# Patient Record
Sex: FEMALE | Race: WHITE | NOT HISPANIC OR LATINO | Employment: UNEMPLOYED | URBAN - NONMETROPOLITAN AREA
[De-identification: names, ages, dates, MRNs, and addresses within clinical notes are randomized per-mention and may not be internally consistent; named-entity substitution may affect disease eponyms.]

---

## 2017-01-01 ENCOUNTER — HOSPITAL ENCOUNTER (INPATIENT)
Facility: HOSPITAL | Age: 77
LOS: 7 days | Discharge: NURSING, REHAB OR LONG-TERM CARE | End: 2017-02-17
Attending: PSYCHIATRY & NEUROLOGY | Admitting: PSYCHIATRY & NEUROLOGY

## 2017-01-01 ENCOUNTER — OUTSIDE FACILITY SERVICE (OUTPATIENT)
Dept: FAMILY MEDICINE CLINIC | Facility: CLINIC | Age: 77
End: 2017-01-01

## 2017-01-01 ENCOUNTER — HOSPITAL ENCOUNTER (EMERGENCY)
Facility: HOSPITAL | Age: 77
Discharge: HOME | End: 2017-02-10
Attending: EMERGENCY MEDICINE | Admitting: EMERGENCY MEDICINE

## 2017-01-01 ENCOUNTER — HOSPITAL ENCOUNTER (EMERGENCY)
Facility: HOSPITAL | Age: 77
End: 2017-06-27
Attending: FAMILY MEDICINE | Admitting: FAMILY MEDICINE

## 2017-01-01 VITALS
HEART RATE: 91 BPM | OXYGEN SATURATION: 95 % | HEIGHT: 65 IN | SYSTOLIC BLOOD PRESSURE: 137 MMHG | TEMPERATURE: 97.6 F | RESPIRATION RATE: 18 BRPM | WEIGHT: 160 LBS | DIASTOLIC BLOOD PRESSURE: 63 MMHG | BODY MASS INDEX: 26.66 KG/M2

## 2017-01-01 VITALS
SYSTOLIC BLOOD PRESSURE: 127 MMHG | OXYGEN SATURATION: 96 % | TEMPERATURE: 97.6 F | DIASTOLIC BLOOD PRESSURE: 9 MMHG | WEIGHT: 160 LBS | BODY MASS INDEX: 26.66 KG/M2 | RESPIRATION RATE: 18 BRPM | HEIGHT: 65 IN | HEART RATE: 81 BPM

## 2017-01-01 DIAGNOSIS — F29 PSYCHOSIS, UNSPECIFIED PSYCHOSIS TYPE (HCC): Primary | ICD-10-CM

## 2017-01-01 DIAGNOSIS — N39.0 ACUTE UTI: ICD-10-CM

## 2017-01-01 DIAGNOSIS — I46.9 CARDIAC ARREST (HCC): Primary | ICD-10-CM

## 2017-01-01 DIAGNOSIS — F20.0 SCHIZOPHRENIA, PARANOID TYPE (HCC): Primary | ICD-10-CM

## 2017-01-01 LAB
ALBUMIN SERPL-MCNC: 3.8 G/DL (ref 3.4–4.8)
ALBUMIN/GLOB SERPL: 1.5 G/DL (ref 1.1–1.8)
ALP SERPL-CCNC: 55 U/L (ref 38–126)
ALT SERPL W P-5'-P-CCNC: 22 U/L (ref 9–52)
AMPHET+METHAMPHET UR QL: NEGATIVE
ANION GAP SERPL CALCULATED.3IONS-SCNC: 7 MMOL/L (ref 5–15)
APAP SERPL-MCNC: <10 MCG/ML (ref 10–30)
AST SERPL-CCNC: 24 U/L (ref 14–36)
BACTERIA SPEC AEROBE CULT: NORMAL
BACTERIA UR QL AUTO: ABNORMAL /HPF
BARBITURATES UR QL SCN: NEGATIVE
BASOPHILS # BLD AUTO: 0.06 10*3/MM3 (ref 0–0.2)
BASOPHILS NFR BLD AUTO: 0.7 % (ref 0–2)
BENZODIAZ UR QL SCN: NEGATIVE
BILIRUB SERPL-MCNC: 0.4 MG/DL (ref 0.2–1.3)
BILIRUB UR QL STRIP: NEGATIVE
BUN BLD-MCNC: 9 MG/DL (ref 7–21)
BUN/CREAT SERPL: 19.6 (ref 7–25)
CALCIUM SPEC-SCNC: 9 MG/DL (ref 8.4–10.2)
CANNABINOIDS SERPL QL: NEGATIVE
CHLORIDE SERPL-SCNC: 96 MMOL/L (ref 95–110)
CLARITY UR: CLEAR
CO2 SERPL-SCNC: 29 MMOL/L (ref 22–31)
COCAINE UR QL: NEGATIVE
COLOR UR: YELLOW
CREAT BLD-MCNC: 0.46 MG/DL (ref 0.5–1)
DEPRECATED RDW RBC AUTO: 43.5 FL (ref 36.4–46.3)
EOSINOPHIL # BLD AUTO: 0.13 10*3/MM3 (ref 0–0.7)
EOSINOPHIL NFR BLD AUTO: 1.5 % (ref 0–7)
ERYTHROCYTE [DISTWIDTH] IN BLOOD BY AUTOMATED COUNT: 13.4 % (ref 11.5–14.5)
ETHANOL BLD-MCNC: <10 MG/DL (ref 0–10)
ETHANOL UR QL: <0.01 %
GFR SERPL CREATININE-BSD FRML MDRD: 131 ML/MIN/1.73 (ref 39–90)
GLOBULIN UR ELPH-MCNC: 2.6 GM/DL (ref 2.3–3.5)
GLUCOSE BLD-MCNC: 115 MG/DL (ref 60–100)
GLUCOSE BLDC GLUCOMTR-MCNC: 255 MG/DL (ref 70–130)
GLUCOSE BLDC GLUCOMTR-MCNC: 97 MG/DL (ref 70–130)
GLUCOSE UR STRIP-MCNC: NEGATIVE MG/DL
HCT VFR BLD AUTO: 38 % (ref 35–45)
HGB BLD-MCNC: 12.3 G/DL (ref 12–15.5)
HGB UR QL STRIP.AUTO: NEGATIVE
HOLD SPECIMEN: NORMAL
HOLD SPECIMEN: NORMAL
HYALINE CASTS UR QL AUTO: ABNORMAL /LPF
IMM GRANULOCYTES # BLD: 0.02 10*3/MM3 (ref 0–0.02)
IMM GRANULOCYTES NFR BLD: 0.2 % (ref 0–0.5)
KETONES UR QL STRIP: NEGATIVE
LEUKOCYTE ESTERASE UR QL STRIP.AUTO: ABNORMAL
LYMPHOCYTES # BLD AUTO: 2.41 10*3/MM3 (ref 0.6–4.2)
LYMPHOCYTES NFR BLD AUTO: 27.5 % (ref 10–50)
MCH RBC QN AUTO: 28.8 PG (ref 26.5–34)
MCHC RBC AUTO-ENTMCNC: 32.4 G/DL (ref 31.4–36)
MCV RBC AUTO: 89 FL (ref 80–98)
METHADONE UR QL SCN: NEGATIVE
MONOCYTES # BLD AUTO: 0.91 10*3/MM3 (ref 0–0.9)
MONOCYTES NFR BLD AUTO: 10.4 % (ref 0–12)
NEUTROPHILS # BLD AUTO: 5.23 10*3/MM3 (ref 2–8.6)
NEUTROPHILS NFR BLD AUTO: 59.7 % (ref 37–80)
NITRITE UR QL STRIP: NEGATIVE
OPIATES UR QL: NEGATIVE
OXYCODONE UR QL SCN: NEGATIVE
PH UR STRIP.AUTO: 6 [PH] (ref 5–9)
PLATELET # BLD AUTO: 231 10*3/MM3 (ref 150–450)
PMV BLD AUTO: 8.7 FL (ref 8–12)
POTASSIUM BLD-SCNC: 4.1 MMOL/L (ref 3.5–5.1)
PROT SERPL-MCNC: 6.4 G/DL (ref 6.3–8.6)
PROT UR QL STRIP: NEGATIVE
RBC # BLD AUTO: 4.27 10*6/MM3 (ref 3.77–5.16)
RBC # UR: ABNORMAL /HPF
REF LAB TEST METHOD: ABNORMAL
SALICYLATES SERPL-MCNC: <1 MG/DL (ref 10–20)
SODIUM BLD-SCNC: 132 MMOL/L (ref 137–145)
SP GR UR STRIP: 1.01 (ref 1–1.03)
SQUAMOUS #/AREA URNS HPF: ABNORMAL /HPF
T4 SERPL-MCNC: 9.21 MCG/DL (ref 5.5–11)
TSH SERPL DL<=0.05 MIU/L-ACNC: 10.3 MIU/ML (ref 0.46–4.68)
UROBILINOGEN UR QL STRIP: ABNORMAL
VALPROATE SERPL-MCNC: 60.5 MCG/ML (ref 50–120)
WBC NRBC COR # BLD: 8.76 10*3/MM3 (ref 3.2–9.8)
WBC UR QL AUTO: ABNORMAL /HPF
WHOLE BLOOD HOLD SPECIMEN: NORMAL
WHOLE BLOOD HOLD SPECIMEN: NORMAL

## 2017-01-01 PROCEDURE — 99232 SBSQ HOSP IP/OBS MODERATE 35: CPT | Performed by: PSYCHIATRY & NEUROLOGY

## 2017-01-01 PROCEDURE — 25010000002 RISPERIDONE MICROSPHERES PER 0.5 MG: Performed by: PSYCHIATRY & NEUROLOGY

## 2017-01-01 PROCEDURE — 99232 SBSQ HOSP IP/OBS MODERATE 35: CPT | Performed by: NURSE PRACTITIONER

## 2017-01-01 PROCEDURE — 99221 1ST HOSP IP/OBS SF/LOW 40: CPT | Performed by: FAMILY MEDICINE

## 2017-01-01 PROCEDURE — 99307 SBSQ NF CARE SF MDM 10: CPT | Performed by: FAMILY MEDICINE

## 2017-01-01 PROCEDURE — 99284 EMERGENCY DEPT VISIT MOD MDM: CPT

## 2017-01-01 PROCEDURE — 97161 PT EVAL LOW COMPLEX 20 MIN: CPT

## 2017-01-01 PROCEDURE — 99305 1ST NF CARE MODERATE MDM 35: CPT | Performed by: FAMILY MEDICINE

## 2017-01-01 PROCEDURE — 80164 ASSAY DIPROPYLACETIC ACD TOT: CPT | Performed by: NURSE PRACTITIONER

## 2017-01-01 PROCEDURE — 25010000002 EPINEPHRINE 0.1 MG/ML SOLUTION PREFILLED SYRINGE: Performed by: FAMILY MEDICINE

## 2017-01-01 PROCEDURE — 90791 PSYCH DIAGNOSTIC EVALUATION: CPT | Performed by: PSYCHIATRY & NEUROLOGY

## 2017-01-01 PROCEDURE — 99238 HOSP IP/OBS DSCHRG MGMT 30/<: CPT | Performed by: NURSE PRACTITIONER

## 2017-01-01 PROCEDURE — 99308 SBSQ NF CARE LOW MDM 20: CPT | Performed by: FAMILY MEDICINE

## 2017-01-01 PROCEDURE — G8979 MOBILITY GOAL STATUS: HCPCS

## 2017-01-01 PROCEDURE — G8980 MOBILITY D/C STATUS: HCPCS

## 2017-01-01 PROCEDURE — 94799 UNLISTED PULMONARY SVC/PX: CPT

## 2017-01-01 PROCEDURE — 82962 GLUCOSE BLOOD TEST: CPT

## 2017-01-01 PROCEDURE — 92950 HEART/LUNG RESUSCITATION CPR: CPT | Performed by: NURSE PRACTITIONER

## 2017-01-01 PROCEDURE — G8978 MOBILITY CURRENT STATUS: HCPCS

## 2017-01-01 RX ORDER — LEVOTHYROXINE SODIUM 0.1 MG/1
100 TABLET ORAL DAILY
Qty: 30 TABLET | Refills: 0 | Status: SHIPPED | OUTPATIENT
Start: 2017-01-01

## 2017-01-01 RX ORDER — ASPIRIN 81 MG/1
81 TABLET, CHEWABLE ORAL DAILY
COMMUNITY

## 2017-01-01 RX ORDER — TEMAZEPAM 15 MG/1
30 CAPSULE ORAL NIGHTLY PRN
COMMUNITY
End: 2017-01-01 | Stop reason: HOSPADM

## 2017-01-01 RX ORDER — LOPERAMIDE HYDROCHLORIDE 2 MG/1
2 CAPSULE ORAL 4 TIMES DAILY PRN
Status: DISCONTINUED | OUTPATIENT
Start: 2017-01-01 | End: 2017-01-01 | Stop reason: HOSPADM

## 2017-01-01 RX ORDER — RISPERIDONE 1 MG/1
1 TABLET ORAL DAILY
COMMUNITY
End: 2017-01-01 | Stop reason: HOSPADM

## 2017-01-01 RX ORDER — METOPROLOL TARTRATE 50 MG/1
50 TABLET, FILM COATED ORAL 2 TIMES DAILY
COMMUNITY

## 2017-01-01 RX ORDER — SODIUM CHLORIDE 0.9 % (FLUSH) 0.9 %
10 SYRINGE (ML) INJECTION AS NEEDED
Status: DISCONTINUED | OUTPATIENT
Start: 2017-01-01 | End: 2017-01-01 | Stop reason: HOSPADM

## 2017-01-01 RX ORDER — IPRATROPIUM BROMIDE AND ALBUTEROL SULFATE 2.5; .5 MG/3ML; MG/3ML
3 SOLUTION RESPIRATORY (INHALATION) EVERY 4 HOURS PRN
COMMUNITY

## 2017-01-01 RX ORDER — DIVALPROEX SODIUM 125 MG/1
1000 CAPSULE, COATED PELLETS ORAL NIGHTLY
Qty: 240 CAPSULE | Refills: 0 | Status: SHIPPED | OUTPATIENT
Start: 2017-01-01

## 2017-01-01 RX ORDER — ASPIRIN 81 MG/1
81 TABLET, CHEWABLE ORAL DAILY
Qty: 30 TABLET | Refills: 0 | Status: SHIPPED | OUTPATIENT
Start: 2017-01-01

## 2017-01-01 RX ORDER — DIVALPROEX SODIUM 125 MG/1
500 CAPSULE, COATED PELLETS ORAL 2 TIMES DAILY
Status: DISCONTINUED | OUTPATIENT
Start: 2017-01-01 | End: 2017-01-01

## 2017-01-01 RX ORDER — LEVOTHYROXINE SODIUM 0.1 MG/1
100 TABLET ORAL DAILY
COMMUNITY

## 2017-01-01 RX ORDER — CLONIDINE HYDROCHLORIDE 0.1 MG/1
0.1 TABLET ORAL EVERY 4 HOURS PRN
Status: DISCONTINUED | OUTPATIENT
Start: 2017-01-01 | End: 2017-01-01 | Stop reason: HOSPADM

## 2017-01-01 RX ORDER — ASPIRIN 81 MG/1
81 TABLET, CHEWABLE ORAL DAILY
Status: DISCONTINUED | OUTPATIENT
Start: 2017-01-01 | End: 2017-01-01 | Stop reason: HOSPADM

## 2017-01-01 RX ORDER — HYDROXYZINE PAMOATE 50 MG/1
50 CAPSULE ORAL EVERY 6 HOURS PRN
Status: DISCONTINUED | OUTPATIENT
Start: 2017-01-01 | End: 2017-01-01 | Stop reason: HOSPADM

## 2017-01-01 RX ORDER — NITROFURANTOIN 25; 75 MG/1; MG/1
100 CAPSULE ORAL ONCE
Status: DISCONTINUED | OUTPATIENT
Start: 2017-01-01 | End: 2017-01-01 | Stop reason: HOSPADM

## 2017-01-01 RX ORDER — LEVOTHYROXINE SODIUM 0.1 MG/1
100 TABLET ORAL DAILY
Status: DISCONTINUED | OUTPATIENT
Start: 2017-01-01 | End: 2017-01-01 | Stop reason: HOSPADM

## 2017-01-01 RX ORDER — RISPERIDONE 1 MG/1
3 TABLET ORAL 2 TIMES DAILY
Status: DISCONTINUED | OUTPATIENT
Start: 2017-01-01 | End: 2017-01-01

## 2017-01-01 RX ORDER — VALPROIC ACID 250 MG/5ML
750 SOLUTION ORAL NIGHTLY
COMMUNITY
End: 2017-01-01 | Stop reason: HOSPADM

## 2017-01-01 RX ORDER — DIVALPROEX SODIUM 125 MG/1
500 CAPSULE, COATED PELLETS ORAL NIGHTLY
Status: DISCONTINUED | OUTPATIENT
Start: 2017-01-01 | End: 2017-01-01

## 2017-01-01 RX ORDER — VALPROIC ACID 250 MG/5ML
500 SOLUTION ORAL DAILY
COMMUNITY
End: 2017-01-01 | Stop reason: HOSPADM

## 2017-01-01 RX ORDER — PSEUDOEPHEDRINE HCL 30 MG
100 TABLET ORAL 2 TIMES DAILY PRN
Qty: 60 EACH | Refills: 0 | Status: SHIPPED | OUTPATIENT
Start: 2017-01-01

## 2017-01-01 RX ORDER — RISPERIDONE 1 MG/1
1 TABLET ORAL 2 TIMES DAILY
Qty: 21 TABLET | Refills: 0 | Status: SHIPPED | OUTPATIENT
Start: 2017-01-01

## 2017-01-01 RX ORDER — DIVALPROEX SODIUM 125 MG/1
1000 CAPSULE, COATED PELLETS ORAL NIGHTLY
Status: DISCONTINUED | OUTPATIENT
Start: 2017-01-01 | End: 2017-01-01

## 2017-01-01 RX ORDER — RISPERIDONE 1 MG/1
2 TABLET ORAL 2 TIMES DAILY
Status: DISCONTINUED | OUTPATIENT
Start: 2017-01-01 | End: 2017-01-01 | Stop reason: HOSPADM

## 2017-01-01 RX ORDER — RISPERIDONE 1 MG/1
2 TABLET ORAL 2 TIMES DAILY
Status: DISCONTINUED | OUTPATIENT
Start: 2017-01-01 | End: 2017-01-01

## 2017-01-01 RX ORDER — DIVALPROEX SODIUM 125 MG/1
1000 CAPSULE, COATED PELLETS ORAL NIGHTLY
Status: DISCONTINUED | OUTPATIENT
Start: 2017-01-01 | End: 2017-01-01 | Stop reason: HOSPADM

## 2017-01-01 RX ORDER — IPRATROPIUM BROMIDE AND ALBUTEROL SULFATE 2.5; .5 MG/3ML; MG/3ML
3 SOLUTION RESPIRATORY (INHALATION) EVERY 4 HOURS PRN
Qty: 360 ML | Refills: 0 | Status: SHIPPED | OUTPATIENT
Start: 2017-01-01

## 2017-01-01 RX ORDER — MAGNESIUM HYDROXIDE/ALUMINUM HYDROXICE/SIMETHICONE 120; 1200; 1200 MG/30ML; MG/30ML; MG/30ML
30 SUSPENSION ORAL EVERY 6 HOURS PRN
Status: DISCONTINUED | OUTPATIENT
Start: 2017-01-01 | End: 2017-01-01 | Stop reason: HOSPADM

## 2017-01-01 RX ORDER — DOCUSATE SODIUM 100 MG/1
100 CAPSULE, LIQUID FILLED ORAL 2 TIMES DAILY PRN
Status: DISCONTINUED | OUTPATIENT
Start: 2017-01-01 | End: 2017-01-01 | Stop reason: HOSPADM

## 2017-01-01 RX ORDER — TRAZODONE HYDROCHLORIDE 50 MG/1
50 TABLET ORAL NIGHTLY PRN
Qty: 30 TABLET | Refills: 0 | Status: SHIPPED | OUTPATIENT
Start: 2017-01-01

## 2017-01-01 RX ORDER — TRAZODONE HYDROCHLORIDE 50 MG/1
50 TABLET ORAL NIGHTLY PRN
Status: DISCONTINUED | OUTPATIENT
Start: 2017-01-01 | End: 2017-01-01 | Stop reason: HOSPADM

## 2017-01-01 RX ORDER — ACETAMINOPHEN 325 MG/1
650 TABLET ORAL EVERY 4 HOURS PRN
Status: DISCONTINUED | OUTPATIENT
Start: 2017-01-01 | End: 2017-01-01 | Stop reason: HOSPADM

## 2017-01-01 RX ORDER — IPRATROPIUM BROMIDE AND ALBUTEROL SULFATE 2.5; .5 MG/3ML; MG/3ML
3 SOLUTION RESPIRATORY (INHALATION) EVERY 4 HOURS PRN
Status: DISCONTINUED | OUTPATIENT
Start: 2017-01-01 | End: 2017-01-01 | Stop reason: HOSPADM

## 2017-01-01 RX ORDER — RISPERIDONE 3 MG/1
3 TABLET ORAL NIGHTLY
COMMUNITY
End: 2017-01-01 | Stop reason: HOSPADM

## 2017-01-01 RX ADMIN — RISPERIDONE 50 MG: KIT at 10:45

## 2017-01-01 RX ADMIN — RISPERIDONE 2 MG: 1 TABLET ORAL at 17:20

## 2017-01-01 RX ADMIN — RISPERIDONE 3 MG: 1 TABLET ORAL at 18:07

## 2017-01-01 RX ADMIN — EPINEPHRINE 1 MG: 0.1 INJECTION INTRACARDIAC; INTRAVENOUS at 11:16

## 2017-01-01 RX ADMIN — RISPERIDONE 3 MG: 1 TABLET ORAL at 08:16

## 2017-01-01 RX ADMIN — METOPROLOL TARTRATE 25 MG: 25 TABLET ORAL at 08:44

## 2017-01-01 RX ADMIN — SODIUM BICARBONATE 50 MEQ: 84 INJECTION INTRAVENOUS at 11:18

## 2017-01-01 RX ADMIN — TRAZODONE HYDROCHLORIDE 50 MG: 50 TABLET ORAL at 20:35

## 2017-01-01 RX ADMIN — DIVALPROEX SODIUM 1000 MG: 125 CAPSULE, COATED PELLETS ORAL at 20:30

## 2017-01-01 RX ADMIN — LEVOTHYROXINE SODIUM 100 MCG: 100 TABLET ORAL at 06:10

## 2017-01-01 RX ADMIN — ASPIRIN 81 MG 81 MG: 81 TABLET ORAL at 09:47

## 2017-01-01 RX ADMIN — RISPERIDONE 3 MG: 1 TABLET ORAL at 09:00

## 2017-01-01 RX ADMIN — ASPIRIN 81 MG 81 MG: 81 TABLET ORAL at 09:00

## 2017-01-01 RX ADMIN — DIVALPROEX SODIUM 500 MG: 125 CAPSULE ORAL at 08:44

## 2017-01-01 RX ADMIN — METOPROLOL TARTRATE 25 MG: 25 TABLET ORAL at 09:47

## 2017-01-01 RX ADMIN — METOPROLOL TARTRATE 25 MG: 25 TABLET ORAL at 09:00

## 2017-01-01 RX ADMIN — LEVOTHYROXINE SODIUM 100 MCG: 100 TABLET ORAL at 06:13

## 2017-01-01 RX ADMIN — METOPROLOL TARTRATE 25 MG: 25 TABLET ORAL at 20:31

## 2017-01-01 RX ADMIN — ASPIRIN 81 MG 81 MG: 81 TABLET ORAL at 08:01

## 2017-01-01 RX ADMIN — METOPROLOL TARTRATE 25 MG: 25 TABLET ORAL at 20:10

## 2017-01-01 RX ADMIN — ASPIRIN 81 MG 81 MG: 81 TABLET ORAL at 08:16

## 2017-01-01 RX ADMIN — RISPERIDONE 3 MG: 1 TABLET ORAL at 17:58

## 2017-01-01 RX ADMIN — RISPERIDONE 2 MG: 1 TABLET ORAL at 08:45

## 2017-01-01 RX ADMIN — LEVOTHYROXINE SODIUM 100 MCG: 100 TABLET ORAL at 06:02

## 2017-01-01 RX ADMIN — DIVALPROEX SODIUM 1000 MG: 125 CAPSULE, COATED PELLETS ORAL at 20:14

## 2017-01-01 RX ADMIN — RISPERIDONE 3 MG: 1 TABLET ORAL at 17:20

## 2017-01-01 RX ADMIN — METOPROLOL TARTRATE 25 MG: 25 TABLET ORAL at 08:01

## 2017-01-01 RX ADMIN — METOPROLOL TARTRATE 25 MG: 25 TABLET ORAL at 20:13

## 2017-01-01 RX ADMIN — METOPROLOL TARTRATE 25 MG: 25 TABLET ORAL at 21:01

## 2017-01-01 RX ADMIN — METOPROLOL TARTRATE 25 MG: 25 TABLET ORAL at 20:35

## 2017-01-01 RX ADMIN — EPINEPHRINE 1 MG: 0.1 INJECTION INTRACARDIAC; INTRAVENOUS at 11:19

## 2017-01-01 RX ADMIN — LEVOTHYROXINE SODIUM 100 MCG: 100 TABLET ORAL at 06:01

## 2017-01-01 RX ADMIN — DIVALPROEX SODIUM 500 MG: 125 CAPSULE ORAL at 17:37

## 2017-01-01 RX ADMIN — ASPIRIN 81 MG 81 MG: 81 TABLET ORAL at 08:45

## 2017-01-01 RX ADMIN — LEVOTHYROXINE SODIUM 100 MCG: 100 TABLET ORAL at 06:36

## 2017-01-01 RX ADMIN — DIVALPROEX SODIUM 500 MG: 125 CAPSULE ORAL at 17:58

## 2017-01-01 RX ADMIN — METOPROLOL TARTRATE 25 MG: 25 TABLET ORAL at 08:16

## 2017-01-01 RX ADMIN — RISPERIDONE 3 MG: 1 TABLET ORAL at 09:47

## 2017-01-01 RX ADMIN — DIVALPROEX SODIUM 1000 MG: 125 CAPSULE, COATED PELLETS ORAL at 20:13

## 2017-01-01 RX ADMIN — RISPERIDONE 3 MG: 1 TABLET ORAL at 08:01

## 2017-01-01 RX ADMIN — DIVALPROEX SODIUM 1000 MG: 125 CAPSULE, COATED PELLETS ORAL at 20:09

## 2017-01-01 RX ADMIN — RISPERIDONE 3 MG: 1 TABLET ORAL at 17:27

## 2017-01-01 RX ADMIN — RISPERIDONE 2 MG: 1 TABLET ORAL at 17:37

## 2017-02-10 PROBLEM — F03.918 DEMENTIA WITH BEHAVIORAL DISTURBANCE (HCC): Status: ACTIVE | Noted: 2017-01-01

## 2017-02-10 NOTE — ED PROVIDER NOTES
Subjective   HPI Comments: Paitent brought by EMS for psychiatric evaluation.  Patient with really unknown symptoms at this point as patient is not a reliable historian.  Patient going on about someone poisoning her in the psat at a psych facility, though seems to have no acute complaints of f/c, n/v, cp/sob, bowel or bladder problems.  Patient has been discussed with the psychiatric team before arrival.      Patient is a 76 y.o. female presenting with mental health disorder.   History provided by:  Patient  History limited by:  Dementia   used: No    Mental Health Problem   Presenting symptoms: no agitation, no self-mutilation and no suicidal thoughts    Degree of incapacity (severity):  Moderate  Onset quality:  Unable to specify  Context: not alcohol use and not drug abuse    Time since last dose of psychoactive medication: unknown duration and chronicity.  Associated symptoms: irritability    Associated symptoms: no abdominal pain, no appetite change, no chest pain and no headaches    Risk factors: hx of mental illness        Review of Systems   Unable to perform ROS: Dementia   Constitutional: Positive for irritability. Negative for appetite change, chills and fever.   HENT: Negative.  Negative for congestion.    Eyes: Negative for photophobia and visual disturbance.   Respiratory: Negative.  Negative for cough, chest tightness and shortness of breath.    Cardiovascular: Negative.  Negative for chest pain and palpitations.   Gastrointestinal: Negative.  Negative for abdominal pain, constipation, diarrhea, nausea and vomiting.   Endocrine: Negative.    Genitourinary: Negative.  Negative for decreased urine volume, dysuria, flank pain and hematuria.   Musculoskeletal: Negative.  Negative for arthralgias, back pain, myalgias, neck pain and neck stiffness.   Skin: Negative.  Negative for pallor.   Neurological: Negative.  Negative for dizziness, syncope, weakness, light-headedness, numbness and  headaches.   Psychiatric/Behavioral: Positive for confusion. Negative for agitation, self-injury and suicidal ideas.       Past Medical History   Diagnosis Date   • Bipolar affective    • COPD (chronic obstructive pulmonary disease)    • Depression    • Disease of thyroid gland    • Dyskinesia    • GERD (gastroesophageal reflux disease)    • Hypertension    • Hyponatremia    • Schizoaffective disorder        No Known Allergies    History reviewed. No pertinent past surgical history.    History reviewed. No pertinent family history.    Social History     Social History   • Marital status: Unknown     Spouse name: N/A   • Number of children: N/A   • Years of education: N/A     Social History Main Topics   • Smoking status: Unknown If Ever Smoked   • Smokeless tobacco: None   • Alcohol use Defer   • Drug use: Defer   • Sexual activity: Defer     Other Topics Concern   • None     Social History Narrative   • None           Objective   Physical Exam   Constitutional: She appears well-developed and well-nourished. No distress.   HENT:   Head: Normocephalic and atraumatic.   Nose: Nose normal.   Mouth/Throat: Oropharynx is clear and moist.       Eyes: Conjunctivae and EOM are normal. No scleral icterus.   Neck: Normal range of motion. Neck supple. No JVD present.   Cardiovascular: Normal rate, regular rhythm, normal heart sounds and intact distal pulses.  Exam reveals no gallop and no friction rub.    No murmur heard.  Pulmonary/Chest: Effort normal. No respiratory distress. She has no wheezes. She has no rales. She exhibits no tenderness.   Abdominal: Soft. She exhibits no distension and no mass. There is no tenderness. There is no rebound and no guarding.   Musculoskeletal: Normal range of motion. She exhibits no edema, tenderness or deformity.   Lymphadenopathy:     She has no cervical adenopathy.   Neurological: She is alert. No cranial nerve deficit. She exhibits normal muscle tone.   Skin: Skin is warm and dry. No  rash noted. She is not diaphoretic. No erythema. No pallor.   Psychiatric: Her affect is blunt and labile. Her speech is slurred. She is slowed. Cognition and memory are impaired. She is inattentive.   Nursing note and vitals reviewed.      Procedures         ED Course  ED Course      Labs Reviewed   COMPREHENSIVE METABOLIC PANEL - Abnormal; Notable for the following:        Result Value    Glucose 115 (*)     Creatinine 0.46 (*)     Sodium 132 (*)     eGFR Non  Amer 131 (*)     All other components within normal limits    Narrative:     The MDRD GFR formula is only valid for adults with stable renal function between ages 18 and 70.   ACETAMINOPHEN LEVEL - Abnormal; Notable for the following:     Acetaminophen <10.0 (*)     All other components within normal limits   SALICYLATE LEVEL - Abnormal; Notable for the following:     Salicylate <1.0 (*)     All other components within normal limits   TSH - Abnormal; Notable for the following:     TSH 10.300 (*)     All other components within normal limits   CBC WITH AUTO DIFFERENTIAL - Abnormal; Notable for the following:     Monocytes, Absolute 0.91 (*)     All other components within normal limits   URINALYSIS W/ CULTURE IF INDICATED - Abnormal; Notable for the following:     Leuk Esterase, UA Small (1+) (*)     All other components within normal limits   URINALYSIS, MICROSCOPIC ONLY - Abnormal; Notable for the following:     RBC, UA 0-2 (*)     WBC, UA 6-12 (*)     All other components within normal limits   T4 - Normal    Narrative:     The concentration of Total T4 in samples from pregnant women is erroneously low (20%) when measured using the access Total T4 Assay.  Erroneously low results could mask hyperthyroidism.  Do not use the Access Total T4 assay as the only marker for evaluating pregnant patients for thyroid disorders.   POCT GLUCOSE FINGERSTICK - Normal   URINE CULTURE   ETHANOL   RAINBOW DRAW    Narrative:     The following orders were created for  panel order [place] Draw.  Procedure                               Abnormality         Status                     ---------                               -----------         ------                     Light Blue Top[[id number]]                                    In process                 Green Top (Gel)[[id number]]                                   In process                 Lavender Top[[id number]]                                      In process                 Gold Top - SST[[id number]]                                    In process                   Please view results for these tests on the individual orders.   URINE DRUG SCREEN   POCT GLUCOSE FINGERSTICK   CBC AND DIFFERENTIAL    Narrative:     The following orders were created for panel order CBC & Differential.  Procedure                               Abnormality         Status                     ---------                               -----------         ------                     CBC Auto Differential[[id number]]         Abnormal            Final result                 Please view results for these tests on the individual orders.   LIGHT BLUE TOP   GREEN TOP   LAVENDER TOP   GOLD TOP - SST       No orders to display     Patient with psychosis, paranoia, dementia.  Seen and evaluated by psychiatry.  Medically cleared for inpatient management.  To the psychiatric floor.  Patient started on macrobid for UTI, asymptomatic, minimal WBC.  Elevated TSH with normal T4.                MDM    Final diagnoses:   Psychosis, unspecified psychosis type   Acute UTI            Zuhair Rm MD  02/10/17 1026

## 2017-02-10 NOTE — ED NOTES
Pt refused IV and blood draw at this time, MD notified     Keaton Alford, LIZBETH  02/10/17 8819

## 2017-02-11 PROBLEM — F03.91 MAJOR NEUROCOGNITIVE DISORDER DUE TO ALZHEIMER'S DISEASE, PROBABLE, WITH BEHAVIORAL DISTURBANCE: Status: ACTIVE | Noted: 2017-01-01

## 2017-02-11 PROBLEM — F06.2 PSYCHOTIC DISORDER DUE TO ANOTHER MEDICAL CONDITION WITH DELUSIONS: Status: ACTIVE | Noted: 2017-01-01

## 2017-02-11 PROBLEM — F03.918 DEMENTIA WITH BEHAVIORAL DISTURBANCE (HCC): Status: RESOLVED | Noted: 2017-01-01 | Resolved: 2017-01-01

## 2017-02-11 NOTE — NURSING NOTE
Behavior   Patient in [facility] day area.  Patient alert to name only.  She does not know she is at the hospital or what city or place she is at.  Patient states she is going home soon.  Patient monitored for elopement risk.  Has not attempted to leave unit.  Denies any thoughts of suicide or homicide.  She has blunted affect.  She is talking about meeting President Naw[employer] Griggs.  Does not answer questions about depression or anxiety.          Intervention  Instructed in med usage and effects, encouraged to verbalize needs. Meds administered as ordered.          Response  Verbalized understanding.  Reorientation as needed.           Plan  Continue to monitor for safety every 15 minute monitoring checks.

## 2017-02-11 NOTE — NURSING NOTE
Called Stephanie Mahoney guardian for pt. And left message for Mrs. Mahoney to call the U for verbal consent Called the hotline number to seek advisement, handed off to charge nurse Francesca Phillips RN currently on the phone.

## 2017-02-11 NOTE — NURSING NOTE
"Behavior   Patient denies that she is having any SI and HI.  States \"why do you want to know everything about me\" and did not answer questions regarding depression or anxiety.  Patient has ill facial affect as if she is mad.  She is flat with answers.  She had been at the exit door and saw herself in the mirror and pointed to mirror and said \"where did you hear that at\" and \"I'll be talking to you in a minute\".            Intervention  Instructed in med usage and effects, encouraged to verbalize needs. Meds administered as ordered.          Response  Verbalized understanding.  Reorientation as needed.           Plan  Continue to monitor for safety  every 15 minute monitoring checks.    "

## 2017-02-11 NOTE — PLAN OF CARE
Problem: Patient Care Overview (Adult)  Goal: Plan of Care Review  Outcome: Ongoing (interventions implemented as appropriate)  Goal: Adult Individualization and Mutuality  Outcome: Ongoing (interventions implemented as appropriate)  Goal: Discharge Needs Assessment  Outcome: Ongoing (interventions implemented as appropriate)    Problem: BH Patient Care Overview (Adult)  Goal: Plan of Care Review  Outcome: Ongoing (interventions implemented as appropriate)  Goal: Individualization and Mutuality  Outcome: Ongoing (interventions implemented as appropriate)  Goal: Discharge Needs Assessment  Outcome: Ongoing (interventions implemented as appropriate)    Problem:  Overarching Goals  Goal: Adheres to Safety Considerations for Self and Others  Outcome: Ongoing (interventions implemented as appropriate)  Goal: Optimized Coping Skills in Response to Life Stressors  Outcome: Ongoing (interventions implemented as appropriate)  Goal: Develops/Participates in Therapeutic [place] to Support Successful Transition  Outcome: Ongoing (interventions implemented as appropriate)    Problem: Coping, Compromised Individual (Adult,Obstetrics,Pediatric)  Goal: Identify Related Risk Factors and Signs and Symptoms  Outcome: Ongoing (interventions implemented as appropriate)  Goal: Effective Coping  Outcome: Ongoing (interventions implemented as appropriate)    Problem: Anxiety (Adult)  Goal: Identify Related Risk Factors and Signs and Symptoms  Outcome: Ongoing (interventions implemented as appropriate)  Goal: Reduction/Resolution  Outcome: Ongoing (interventions implemented as appropriate)

## 2017-02-11 NOTE — NURSING NOTE
Behavior   Anxiety 2  Depression 0  Pain 0  AVH no  S/I no  H/I no   Attempted to give Metoprolol & pt. refused. Pt. Got into bed & would not finish admission paper work or assessments.            Intervention  Instructed in med usage and effects, encouraged to verbalize needs.         Response  Verbalized understanding           Plan  Continue to monitor for safety/  every 15 minute monitoring checks.

## 2017-02-11 NOTE — PLAN OF CARE
Problem: BH Overarching Goals  Goal: Adheres to Safety Considerations for Self and Others  Outcome: Ongoing (interventions implemented as appropriate)  Goal: Optimized Coping Skills in Response to Life Stressors  Outcome: Ongoing (interventions implemented as appropriate)  Goal: Develops/Participates in Therapeutic [place] to Support Successful Transition  Outcome: Ongoing (interventions implemented as appropriate)    Problem: Coping, Compromised Individual (Adult,Obstetrics,Pediatric)  Goal: Identify Related Risk Factors and Signs and Symptoms  Outcome: Ongoing (interventions implemented as appropriate)  Goal: Effective Coping  Outcome: Ongoing (interventions implemented as appropriate)    Problem: Risk for Violence/Aggression Toward Others  Goal: Treatment Goal: Refrain from acts of violence/aggression during length of stay, and demonstrate improved impulse control at the time of discharge  Outcome: Ongoing (interventions implemented as appropriate)  Goal: Verbalize thoughts and feelings associated with:  Outcome: Ongoing (interventions implemented as appropriate)  Goal: Refrain from harming others  Outcome: Ongoing (interventions implemented as appropriate)  Goal: Refrain from destructive acts on the environment or property  Outcome: Ongoing (interventions implemented as appropriate)  Goal: Control angry outbursts  Outcome: Ongoing (interventions implemented as appropriate)  Goal: Attend and participate in unit activities, including therapeutic, recreational, and educational groups  Outcome: Ongoing (interventions implemented as appropriate)  Goal: Identify appropriate positive anger management techniques  Outcome: Ongoing (interventions implemented as appropriate)

## 2017-02-11 NOTE — NURSING NOTE
"Pt. Continues to refuse medications, to sign forms, answer MMSE& crisis prevention plan. Pt. States \" I am waiting on my dad to come pick her up\" \" I don't live in no nursing home\" \" those pills are poison\" \" I am not signing nothing, I am not answering you\" \" my kids are coming to get me in a few minutes\". Pt. Took arm band on & staff replaced.    "
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
## 2017-02-11 NOTE — NURSING NOTE
Pt. Has been living at nursing home ( [facility] in [facility]) & has not been accepting medications, refused all medications in February. Staff at nursing home reveal she has experienced visual & auditory hallucinations, delusions and verbally aggressive with staff. At present pt. sitting in mer[place] watching TV, pleasant calm & cooperative.

## 2017-02-11 NOTE — NURSING NOTE
Pt. escorted to [facility] per security via w/c & admitted to room 668. Orientation to room/unit provided.  Pt. Noted to be [place] & signee increased tone to for effective communication. Pt. Smiling, calm, cooperative & maintains gooe eye contact.

## 2017-02-11 NOTE — NURSING NOTE
Dr Flores ROS         General  NONE    Eyes   glasses/contact lens    ENT/Mouth   Hearing Loss    Cardio   None    Resp   None    GI    None       None    MS    None    Skin/Hair/Nails   None    Neuro   None

## 2017-02-11 NOTE — NURSING NOTE
Patient in hallway by herself.  Patient is sitting in mer[place] talking out loud as though there is another person sitting there as she is talking.  Signee asked patient who she is talking to and she replied to the men right there in the merida as she was pointing.  Patient continued this for a few minutes and then she stopped.

## 2017-02-12 NOTE — NURSING NOTE
"Behavior   Patient sitting in her w/c in her room.  Patient has good eye contact.  Smiling when conversing.  Patient states \"no\" when asked if she is feeling depressed or anxious.  She is calm and cooperative.  She denies SI and HI.            Intervention  Instructed in med usage and effects, encouraged to verbalize needs. Meds administered as ordered.          Response  Verbalized understanding.  Reorientation as needed.           Plan  Continue to monitor for safety every 15 minute monitoring checks.    "

## 2017-02-12 NOTE — PLAN OF CARE
Problem: BH Patient Care Overview (Adult)  Goal: Plan of Care Review  Outcome: Ongoing (interventions implemented as appropriate)    02/12/17 0542   Coping/Psychosocial Response Interventions   Plan Of Care Reviewed With patient   Coping/Psychosocial   Patient Agreement with Plan of Care refuses to participate   Patient Care Overview   Progress no change       Goal: Individualization and Mutuality  Outcome: Ongoing (interventions implemented as appropriate)  Goal: Discharge Needs Assessment  Outcome: Ongoing (interventions implemented as appropriate)    Problem: BH Overarching Goals  Goal: Adheres to Safety Considerations for Self and Others  Outcome: Ongoing (interventions implemented as appropriate)  Goal: Optimized Coping Skills in Response to Life Stressors  Outcome: Ongoing (interventions implemented as appropriate)  Goal: Develops/Participates in Therapeutic [place] to Support Successful Transition  Outcome: Ongoing (interventions implemented as appropriate)

## 2017-02-12 NOTE — NURSING NOTE
"Behavior     Pt. Was sitting up in tv area & watching staff & peers with smile on face. Signee approached pt. To ask how she was feeling now & pt. Put her head down. Signee asked pt. To open her eyes & look at me so we could talk, pt. Immediately closed eyes tightly, put hands over face & shook her head no. Attempted 3 additional times to encourage pt. To talk & pt. Continued to refuse. During interactions with peers, pt. Would state \" hey girl I want ice cream\", \" hey lady unlock my door\"         Intervention  Instructed in med usage and effects, encouraged to verbalize needs. Meds administered as ordered.          Response  Pt. Did not acknowledge attempts with conversation & only wanted to communicate when signee was engaging in conversation with others.         Plan  Continue to monitor for safety/  every 15 minute monitoring checks.  "

## 2017-02-12 NOTE — PROGRESS NOTES
2/12/2017    Chief Complaint: dementia related behavioral issues, delusions and paranoia    Subjective:  Patient is a 76 y.o. female who was hospitalized for dementia related behavioral issues, delusions and paranoia.   Since yesterday the patient has been the same.  She cont to be paranoid.  She reports that she needs a weapon to protect herself.  She is afraid her family will come and try to harm her. Patient's medications are tolerable.    Objective     Vital Signs    Temp:  [97.1 °F (36.2 °C)-97.2 °F (36.2 °C)] 97.1 °F (36.2 °C)  Heart Rate:  [69-95] 95  Resp:  [18] 18  BP: (120-131)/(58-64) 120/58    Physical Exam:   General Appearance: alert and uncooperative,  Hygiene:   fair  Gait & Station: Wheelchair  Musculoskeletal: No tremors or abnormal involuntary movements}    Mental Status Exam:   Cooperation:  Suspicious  Eye Contact:  Poor  Psychomotor Behavior:  Restless  Mood: Irritable  Affect:  mood-congruent  Speech:  Normal  Thought Process:  Coherent and [place]  Associations: Goal Directed  Thought Content:     Bizarre   Suicidal:  None   Homicidal:  None   Hallucinations:  Not demonstrated today   Delusion:  Paranoid  Cognitive Functioning:   Consciousness: awake and alert and Orientation:  Person  Reliability:  poor  Insight:  Poor  Judgement:  Poor  Impulse Control:  Poor    Lab Results (last 24 hours)     ** No results found for the last 24 hours. **        Imaging Results (last 24 hours)     ** No results found for the last 24 hours. **          Medicine:   Current Facility-Administered Medications:   •  acetaminophen (TYLENOL) tablet 650 mg, 650 mg, Oral, Q4H PRN, Ivette Resendiz MD  •  aluminum-magnesium hydroxide-simethicone (MAALOX/MYLANTA) suspension 30 mL, 30 mL, Oral, Q6H PRN, Ivette Resendiz MD  •  aspirin chewable tablet 81 mg, 81 mg, Oral, Daily, Ivette Resendiz MD, 81 mg at 02/12/17 0845  •  CloNIDine (CATAPRES) tablet 0.1 mg, 0.1 mg, Oral, Q4H PRN, Ivette Resendiz MD  •   Divalproex Sodium (DEPAKOTE SPRINKLE) capsule 500 mg, 500 mg, Oral, BID, Ivette Resendiz MD, 500 mg at 02/12/17 0844  •  docusate sodium (COLACE) capsule 100 mg, 100 mg, Oral, BID PRN, Ivette Resendiz MD  •  hydrOXYzine (VISTARIL) capsule 50 mg, 50 mg, Oral, Q6H PRN, Ivette Resendiz MD  •  ipratropium-albuterol (DUO-NEB) nebulizer solution 3 mL, 3 mL, Nebulization, Q4H PRN, Ivette Resendiz MD  •  levothyroxine (SYNTHROID, LEVOTHROID) tablet 100 mcg, 100 mcg, Oral, Daily, Ivette Resendiz MD, 100 mcg at 02/12/17 0636  •  loperamide (IMODIUM) capsule 2 mg, 2 mg, Oral, 4x Daily PRN, Ivette Resendiz MD  •  magnesium hydroxide (MILK OF MAGNESIA) suspension 2400 mg/10mL 10 mL, 10 mL, Oral, Daily PRN, Ivette Resendiz MD  •  metoprolol tartrate (LOPRESSOR) tablet 25 mg, 25 mg, Oral, Q12H, Ivette Resendiz MD, 25 mg at 02/12/17 0844  •  risperiDONE (risperDAL) tablet 2 mg, 2 mg, Oral, BID, Ivette Resendiz MD, 2 mg at 02/12/17 0845  •  [START ON 2/13/2017] risperiDONE microspheres (risperDAL CONSTA) injection 50 mg, 50 mg, Intramuscular, Q14 Days, Ivette Resendiz MD  •  traZODone (DESYREL) tablet 50 mg, 50 mg, Oral, Nightly PRN, Ivette Resendiz MD    Diagnoses/Assessment:   Active Problems:    Major neurocognitive disorder due to Alzheimer's disease, probable, with behavioral disturbance    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  2) Will continue to provide treatment with the unit milieu, activities, therapies and psychopharmacological management.  3) Patient to be placed on or continued on  Q15 minute checks  and Elopement, Aggression and Fall precautions.  4) Will continue medical management by Dr. Flores.  5) Will order following labs: none  6) Will make the following medication changes: cont the depakote.  Will  increase the risperdal to 3mg bid due to cont psychosis.  Will get the risperdal consta tomorrow.  7) Will continue discharge planning as appropriate for patient.  8) Psychotherapy provided: none      Ivette Resendiz MD  02/12/17  1:17 PM

## 2017-02-12 NOTE — NURSING NOTE
"Behavior   Anxiety-states \"no\"  Depression -states \"no\"  Pain -does not hurt  AVH -patient denies but has been talking to someone that is not in her room this afternoon-reorientation unsuccessful  S/I -denies  H/I -denies            Intervention  Instructed in med usage and effects, encouraged to verbalize needs. Meds administered as ordered.          Response  Verbalized understanding.  Reorientation as needed.           Plan  Continue to monitor for safety every 15 minute monitoring checks.    "

## 2017-02-12 NOTE — PLAN OF CARE
Problem: BH Overarching Goals  Goal: Adheres to Safety Considerations for Self and Others  Outcome: Ongoing (interventions implemented as appropriate)  Goal: Optimized Coping Skills in Response to Life Stressors  Outcome: Ongoing (interventions implemented as appropriate)  Goal: Develops/Participates in Therapeutic [place] to Support Successful Transition  Outcome: Ongoing (interventions implemented as appropriate)    Problem: Coping, Compromised Individual (Adult,Obstetrics,Pediatric)  Goal: Identify Related Risk Factors and Signs and Symptoms  Outcome: Ongoing (interventions implemented as appropriate)  Goal: Effective Coping  Outcome: Ongoing (interventions implemented as appropriate)    Problem: Risk for Violence/Aggression Toward Others  Goal: Treatment Goal: Refrain from acts of violence/aggression during length of stay, and demonstrate improved impulse control at the time of discharge  Outcome: Ongoing (interventions implemented as appropriate)  Goal: Verbalize thoughts and feelings associated with:  Outcome: Ongoing (interventions implemented as appropriate)  Goal: Refrain from harming others  Outcome: Ongoing (interventions implemented as appropriate)  Goal: Refrain from destructive acts on the environment or property  Outcome: Ongoing (interventions implemented as appropriate)  Goal: Control angry outbursts  Outcome: Ongoing (interventions implemented as appropriate)  Goal: Attend and participate in unit activities, including therapeutic, recreational, and educational groups  Outcome: Ongoing (interventions implemented as appropriate)  Goal: Identify appropriate positive anger management techniques  Outcome: Ongoing (interventions implemented as appropriate)

## 2017-02-13 PROBLEM — F06.2 PSYCHOTIC DISORDER DUE TO ANOTHER MEDICAL CONDITION WITH DELUSIONS: Status: RESOLVED | Noted: 2017-01-01 | Resolved: 2017-01-01

## 2017-02-13 PROBLEM — F20.0 SCHIZOPHRENIA, PARANOID TYPE (HCC): Status: ACTIVE | Noted: 2017-01-01

## 2017-02-13 NOTE — SIGNIFICANT NOTE
02/13/17 1400   Individual Counseling   Time Session Began 1330   Time Session Ended 1400   Total Time (minutes) 30   Topic Initial Assessment and PGDRS   Session Detail CSW met with pt 1:1 and spoke with state guardian, Stephanie Mahoney, in order to complete psychosocial assessment and PGDRS.   Patient Response Pt was plesant and cooperative. Pt was alert and oriented to self only. Pt thought that she was still in [place] at [facility]. Pt was unsure why she was sent to the hospital. Pt was easily distracted and often jumped from topic to topic. Pt insight and judgement are impaired at this time. CSW spoke with state guardian, who stated that pt has had multiple psych hospitalizations in the past. Pt was recently at [facility] for approx a month. Per guardian, pt has been combative, yelling, cursing, and refusing medications. Guardian states that this is not typical behaviors for pt. Guardian could not provide much of a detailed hx, specifically re: family hx of mental illness. Guardianship was started in 2004, pt has been residing at [facility] since 2012. Goal is for pt to become stable and return to SNF. CSW to continue to work with pt on reorientation and behavior modification. Tx team developed tx plan, CSW discussed that plan with guardian.

## 2017-02-13 NOTE — PLAN OF CARE
Problem: BH Overarching Goals  Goal: Adheres to Safety Considerations for Self and Others  Outcome: Ongoing (interventions implemented as appropriate)  Goal: Optimized Coping Skills in Response to Life Stressors  Outcome: Ongoing (interventions implemented as appropriate)  Goal: Develops/Participates in Therapeutic [place] to Support Successful Transition  Outcome: Ongoing (interventions implemented as appropriate)    Problem: Coping, Compromised Individual (Adult,Obstetrics,Pediatric)  Goal: Identify Related Risk Factors and Signs and Symptoms  Outcome: Ongoing (interventions implemented as appropriate)  Goal: Effective Coping  Outcome: Ongoing (interventions implemented as appropriate)    Problem: Risk for Violence/Aggression Toward Others  Goal: Treatment Goal: Refrain from acts of violence/aggression during length of stay, and demonstrate improved impulse control at the time of discharge  Outcome: Ongoing (interventions implemented as appropriate)  Goal: Verbalize thoughts and feelings associated with:  Outcome: Ongoing (interventions implemented as appropriate)  Goal: Refrain from harming others  Outcome: Ongoing (interventions implemented as appropriate)  Goal: Refrain from destructive acts on the environment or property  Outcome: Ongoing (interventions implemented as appropriate)  Goal: Control angry outbursts  Outcome: Ongoing (interventions implemented as appropriate)  Goal: Attend and participate in unit activities, including therapeutic, recreational, and educational groups  Outcome: Ongoing (interventions implemented as appropriate)  Goal: Identify appropriate positive anger management techniques  Outcome: Ongoing (interventions implemented as appropriate)

## 2017-02-13 NOTE — PLAN OF CARE
Problem: BH Patient Care Overview (Adult)  Goal: Interdisciplinary Rounds/Family Conference  Outcome: Ongoing (interventions implemented as appropriate)    02/13/17 0950   Interdisciplinary Rounds/Family Conf   Participants advanced practice nurse;social work;therapeutic recreation;other (see comments);nursing

## 2017-02-13 NOTE — NURSING NOTE
Behavior   Anxiety -states no  Depression -states no  Pain denies  AVH denies-patient has not been witnessed talking to someone not there so far this shift  S/I says to no leave her alone  H/I says no leave her alone    Intervention  Instructed in med usage and effects, encouraged to verbalize needs. Meds administered as ordered.      Response  Verbalized understanding. Reorientation as needed           Plan  Continue to monitor for safety every 15 minute monitoring checks.

## 2017-02-13 NOTE — NURSING NOTE
"Behavior   Anxiety 0  Depression 0  Pain 0  AVH no  S/I no  H/I no    Patient is a/o to name only, becomes agitated when staff tries to orient her.  She told this RN to \"go away.\"  She would only take meds crushed in vanilla ice cream.        Intervention  Instructed in med usage and effects, encouraged to verbalize needs. Meds administered as ordered.  Req. Freg. Redirection.  Is unable to follow simple instructions.          Response  Verbalized understanding, yelling, cussing at staff.           Plan  Continue to monitor for safety/  every 15 minute monitoring checks.    "

## 2017-02-13 NOTE — NURSING NOTE
Behavior   Anxiety no  Depression no  Pain 0  AVH denies  S/I denies  H/I denies    Patient in room in w/c with her feet propped up on edge of bed.  Good eye contact.  Patient does have occasional episodes of talking to someone who is not in her room but has not become agitated or aggressive.          Intervention  Instructed in med usage and effects, encouraged to verbalize needs. Meds administered as ordered.          Response  Verbalized understanding.  Reorientation as needed.           Plan  Continue to monitor for safety every 15 minute monitoring checks.

## 2017-02-13 NOTE — NURSING NOTE
Patient received Risperdal Consta in left deltoid-cooperative with injection.  Explained what medicine patient was receiving.

## 2017-02-13 NOTE — PLAN OF CARE
Problem: BH Patient Care Overview (Adult)  Goal: Individualization and Mutuality  Outcome: Ongoing (interventions implemented as appropriate)  Goal: Discharge Needs Assessment  Outcome: Ongoing (interventions implemented as appropriate)

## 2017-02-13 NOTE — PLAN OF CARE
Problem: BH Patient Care Overview (Adult)  Goal: Plan of Care Review  Outcome: Ongoing (interventions implemented as appropriate)  Goal: Individualization and Mutuality  Outcome: Ongoing (interventions implemented as appropriate)  Goal: Discharge Needs Assessment  Outcome: Ongoing (interventions implemented as appropriate)    Problem: BH Overarching Goals  Goal: Adheres to Safety Considerations for Self and Others  Outcome: Ongoing (interventions implemented as appropriate)  Goal: Optimized Coping Skills in Response to Life Stressors  Outcome: Ongoing (interventions implemented as appropriate)  Goal: Develops/Participates in Therapeutic [place] to Support Successful Transition  Outcome: Ongoing (interventions implemented as appropriate)  Patient is unable to participate in program activities d/t her dementia.  She is a/o to name only.  She smiles and denies needing anything from staff.

## 2017-02-13 NOTE — PROGRESS NOTES
2/13/2017    Chief Complaint: dementia related behavioral issues, psychosis and non compliance of medications, verbally aggressive.    Subjective:  Patient is a 76 y.o. female who was hospitalized for dementia related behavioral issues.   Since yesterday the patient has been continuing to take but question her medications. Further history reported: nursing staff reports that patient will not take her medications. She will strike out if staff gets too close. She has been questioning her medications. Is on Risperdal Consta. Today will be her third injection. She has a diagnosis of Schizophrenia.    Objective     Vital Signs    Temp:  [96.2 °F (35.7 °C)-96.6 °F (35.9 °C)] 96.6 °F (35.9 °C)  Heart Rate:  [77-78] 77  Resp:  [16-20] 16  BP: (123)/(59-63) 123/63    Physical Exam:   General Appearance: alert and uncooperative,  Hygiene: fair  Gait & Station: Wheelchair  Musculoskeletal: No tremors or abnormal involuntary movements}     Mental Status Exam:   Cooperation: Suspicious  Eye Contact: Poor  Psychomotor Behavior: Restless  Mood: Irritable  Affect: mood-congruent  Speech: Normal  Thought Process: Coherent and Gordon  Associations: Goal Directed  Thought Content:   Bizarre  Suicidal: None  Homicidal: None  Hallucinations: Not demonstrated today  Delusion: Paranoid  Cognitive Functioning:  Consciousness: awake and alert and Orientation: Person  Reliability: poor  Insight: Poor  Judgement: Poor  Impulse Control: Poor-easily irritated by staff's directioning    Lab Results (last 24 hours)     ** No results found for the last 24 hours. **        Imaging Results (last 24 hours)     ** No results found for the last 24 hours. **          Medicine:   Current Facility-Administered Medications:   •  acetaminophen (TYLENOL) tablet 650 mg, 650 mg, Oral, Q4H PRN, Ivette Resendiz MD  •  aluminum-magnesium hydroxide-simethicone (MAALOX/MYLANTA) suspension 30 mL, 30 mL, Oral, Q6H PRN, Ivette Resendiz MD  •  aspirin  chewable tablet 81 mg, 81 mg, Oral, Daily, Ivette Resendiz MD, 81 mg at 02/12/17 0845  •  CloNIDine (CATAPRES) tablet 0.1 mg, 0.1 mg, Oral, Q4H PRN, Ivette Resendiz MD  •  Divalproex Sodium (DEPAKOTE SPRINKLE) capsule 500 mg, 500 mg, Oral, BID, Ivette Resendiz MD, 500 mg at 02/12/17 1758  •  docusate sodium (COLACE) capsule 100 mg, 100 mg, Oral, BID PRN, Ivette Resendiz MD  •  hydrOXYzine (VISTARIL) capsule 50 mg, 50 mg, Oral, Q6H PRN, Ivette Resendiz MD  •  ipratropium-albuterol (DUO-NEB) nebulizer solution 3 mL, 3 mL, Nebulization, Q4H PRN, Ivette Resendiz MD  •  levothyroxine (SYNTHROID, LEVOTHROID) tablet 100 mcg, 100 mcg, Oral, Daily, Ivette Resendiz MD, 100 mcg at 02/12/17 0636  •  loperamide (IMODIUM) capsule 2 mg, 2 mg, Oral, 4x Daily PRN, Ivette Resendiz MD  •  magnesium hydroxide (MILK OF MAGNESIA) suspension 2400 mg/10mL 10 mL, 10 mL, Oral, Daily PRN, Ivette Resendiz MD  •  metoprolol tartrate (LOPRESSOR) tablet 25 mg, 25 mg, Oral, Q12H, Ivette Resendiz MD, 25 mg at 02/12/17 2035  •  risperiDONE (risperDAL) tablet 3 mg, 3 mg, Oral, BID, Ivette Resendiz MD, 3 mg at 02/12/17 1758  •  risperiDONE microspheres (risperDAL CONSTA) injection 50 mg, 50 mg, Intramuscular, Q14 Days, Ivette Resendiz MD  •  traZODone (DESYREL) tablet 50 mg, 50 mg, Oral, Nightly PRN, Ivette Resendiz MD, 50 mg at 02/12/17 2035    Diagnoses/Assessment:   Patient Active Problem List    Diagnosis   • Schizophrenia, paranoid type [F20.0]   • Major neurocognitive disorder due to Alzheimer's disease, probable, with behavioral disturbance [G30.9, F02.81]       Treatment Plan:    1) Will continue care for the patient on the behavioral health unit at [facility] to ensure patient safety.  2) Will continue to provide treatment with the unit milieu, activities, therapies and psychopharmacological management.  3) Patient to be placed on or continued on  Q15 minute checks  and Elopement,  Aggression and Fall precautions.  4) Will continue medical management by Dr. Flores's consult.  5) Will order following labs: no new labs  6) Will make the following medication changes: change Depakate to 1000 mg at HS to decrease the amount of dosing times. Med pass time causing agitation and paranoia.  7) Will continue discharge planning as appropriate for patient.  8) Psychotherapy provided: none    Treatment plan and medication risks and benefits discussed with: nursing staff and , and Dr. Resendiz for medication changes.    Dana Bojorquez, APRN  02/13/17  7:58 AM

## 2017-02-14 NOTE — NURSING NOTE
Behavior Pt noted to isolate self in room most of the day. Only coming out to eat        Intervention Monitored throughout shift      Response Continues to yell out at peers at times, but easily redirected.         Plan Will monitor for safety and assist to meet treatment goals.

## 2017-02-14 NOTE — PLAN OF CARE
CSW received call from Whit with [facility] stating that she had been contacted by [facility] requesting pt be dc'd to [facility] due to behavioral concerns. Whit requested referral be sent. CSW called pt's guardian, Stephanie Mahoney and received consent to fax referral. CSW to follow up pending status.

## 2017-02-14 NOTE — PROGRESS NOTES
2/14/2017    Chief Complaint: dementia related behavioral issues, psychosis and verbally aggressive, non-adherence with medications    Subjective:  Patient is a 76 y.o. female who was hospitalized for dementia related behavioral issues.   Since yesterday the patient has been tolerating the Risperdal Consta injection. Less irritable. Has been taking her medication in food today.  Patient reports medications are effective.  Further history reported: Improved and more cooperative    Objective     Vital Signs    Temp:  [96.7 °F (35.9 °C)-97.5 °F (36.4 °C)] 97.5 °F (36.4 °C)  Heart Rate:  [75-98] 98  Resp:  [18-20] 20  BP: (101-134)/(61-65) 134/65    Physical Exam:   General Appearance: alert, appears stated age and overweight,  Hygiene:   fair  Gait & Station: Wheelchair  Musculoskeletal: No tremors or abnormal involuntary movements}    Mental Status Exam:   Cooperation:  Suspicious  Eye Contact:  Fair  Psychomotor Behavior:  Slow  Mood: Improving  Affect:  blunted  Speech:  Minimal  Thought Process:  Poverty of thought  Associations: Loose Associations  Thought Content:     Normal   Suicidal:  None   Homicidal:  None   Hallucinations:  None   Delusion:  Paranoid  Cognitive Functioning:   Consciousness: awake and alert  Reliability:  poor  Insight:  Fair  Judgement:  Impaired  Impulse Control:  Fair    Lab Results (last 24 hours)     ** No results found for the last 24 hours. **        Imaging Results (last 24 hours)     ** No results found for the last 24 hours. **          Medicine:   Current Facility-Administered Medications:   •  acetaminophen (TYLENOL) tablet 650 mg, 650 mg, Oral, Q4H PRN, Ivette Resendiz MD  •  aluminum-magnesium hydroxide-simethicone (MAALOX/MYLANTA) suspension 30 mL, 30 mL, Oral, Q6H PRN, Ivette Resendiz MD  •  aspirin chewable tablet 81 mg, 81 mg, Oral, Daily, Ivette Resendiz MD, 81 mg at 02/14/17 0801  •  CloNIDine (CATAPRES) tablet 0.1 mg, 0.1 mg, Oral, Q4H PRN, Ivette  MD Astrid  •  Divalproex Sodium (DEPAKOTE SPRINKLE) capsule 1,000 mg, 1,000 mg, Oral, Nightly, LIVE Griggs, 1,000 mg at 02/13/17 2030  •  docusate sodium (COLACE) capsule 100 mg, 100 mg, Oral, BID PRN, Ivette Resendiz MD  •  hydrOXYzine (VISTARIL) capsule 50 mg, 50 mg, Oral, Q6H PRN, Ivette Resendiz MD  •  ipratropium-albuterol (DUO-NEB) nebulizer solution 3 mL, 3 mL, Nebulization, Q4H PRN, Ivette Resendiz MD  •  levothyroxine (SYNTHROID, LEVOTHROID) tablet 100 mcg, 100 mcg, Oral, Daily, Ivette Resendiz MD, 100 mcg at 02/14/17 0601  •  loperamide (IMODIUM) capsule 2 mg, 2 mg, Oral, 4x Daily PRN, Ivette Resendiz MD  •  magnesium hydroxide (MILK OF MAGNESIA) suspension 2400 mg/10mL 10 mL, 10 mL, Oral, Daily PRN, Ivette Resendiz MD  •  metoprolol tartrate (LOPRESSOR) tablet 25 mg, 25 mg, Oral, Q12H, Ivette Resendiz MD, 25 mg at 02/14/17 0801  •  risperiDONE (risperDAL) tablet 3 mg, 3 mg, Oral, BID, Ivette Resendiz MD, 3 mg at 02/14/17 0801  •  risperiDONE microspheres (risperDAL CONSTA) injection 50 mg, 50 mg, Intramuscular, Q14 Days, Ivette Resendiz MD, 50 mg at 02/13/17 1045  •  traZODone (DESYREL) tablet 50 mg, 50 mg, Oral, Nightly PRN, Ivette Resendiz MD, 50 mg at 02/12/17 2035    Diagnoses/Assessment:   Active Problems:    Major neurocognitive disorder due to Alzheimer's disease, probable, with behavioral disturbance    Schizophrenia, paranoid type      Treatment Plan:       1) Will continue care for the patient on the behavioral health unit at [facility] to ensure patient safety.  2) Will continue to provide treatment with the unit milieu, activities, therapies and psychopharmacological management.  3) Patient to be placed on or continued on Q15 minute checks and Elopement, Aggression and Fall precautions.  4) Will continue medical management by Dr. Flores's consult.  5) Will order following labs: no new labs  6) Will make the following medication  changes: none  7) Will continue discharge planning as appropriate for patient.  8) Psychotherapy provided: none  Treatment plan and medication risks and benefits discussed with: nursing staff and     LIVE Griggs  02/14/17  10:40 AM

## 2017-02-14 NOTE — DISCHARGE PLACEMENT REQUEST
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  8) Psychotherapy provided: none    Treatment plan and medication risks and benefits discussed with: nursing staff and , and Dr. Resendiz for medication changes.    LIVE Griggs  02/13/17  7:58 AM         Electronically signed by LIVE Griggs at 2/13/2017  1:20 PM      LIVE Griggs at 2/14/2017 10:40 AM  Version 1 of 1             2/14/2017    Chief Complaint: dementia related behavioral issues, psychosis and verbally aggressive, non-adherence with medications    Subjective:  Patient is a 76 y.o. female who was hospitalized for dementia related behavioral issues.   Since yesterday the patient has been tolerating the Risperdal Consta injection. Less irritable. Has been taking her medication in food today.  Patient reports medications are effective.  Further history reported: Improved and more cooperative    Objective     Vital Signs    Temp:  [96.7 °F (35.9 °C)-97.5 °F (36.4 °C)] 97.5 °F (36.4 °C)  Heart Rate:  [75-98] 98  Resp:  [18-20] 20  BP: (101-134)/(61-65) 134/65    Physical Exam:   General Appearance: alert,  appears stated age and overweight,  Hygiene:   fair  Gait & Station: Wheelchair  Musculoskeletal: No tremors or abnormal involuntary movements}    Mental Status Exam:   Cooperation:  Suspicious  Eye Contact:  Fair  Psychomotor Behavior:  Slow  Mood: Improving  Affect:  blunted  Speech:  Minimal  Thought Process:  Poverty of thought  Associations: Loose Associations  Thought Content:     Normal   Suicidal:  None   Homicidal:  None   Hallucinations:  None   Delusion:  Paranoid  Cognitive Functioning:   Consciousness: awake and alert  Reliability:  poor  Insight:  Fair  Judgement:  Impaired  Impulse Control:  Fair    Lab Results (last 24 hours)     ** No results found for the last 24 hours. **        Imaging Results (last 24 hours)     ** No results found for the last 24 hours. **          Medicine:   Current Facility-Administered Medications:   •  acetaminophen (TYLENOL) tablet 650 mg, 650 mg, Oral, Q4H PRN, Ivette Resendiz MD  •  aluminum-magnesium hydroxide-simethicone (MAALOX/MYLANTA) suspension 30 mL, 30 mL, Oral, Q6H PRN, Ivette Resendiz MD  •  aspirin chewable tablet 81 mg, 81 mg, Oral, Daily, Ivette Resendiz MD, 81 mg at 02/14/17 0801  •  CloNIDine (CATAPRES) tablet 0.1 mg, 0.1 mg, Oral, Q4H PRN, Ivette Resendiz MD  •  Divalproex Sodium (DEPAKOTE SPRINKLE) capsule 1,000 mg, 1,000 mg, Oral, Nightly, Dana Bojorquez, APRN, 1,000 mg at 02/13/17 2030  •  docusate sodium (COLACE) capsule 100 mg, 100 mg, Oral, BID PRN, Ivette Resendiz MD  •  hydrOXYzine (VISTARIL) capsule 50 mg, 50 mg, Oral, Q6H PRN, Ivette Resendiz MD  •  ipratropium-albuterol (DUO-NEB) nebulizer solution 3 mL, 3 mL, Nebulization, Q4H PRN, Ivette Resendiz MD  •  levothyroxine (SYNTHROID, LEVOTHROID) tablet 100 mcg, 100 mcg, Oral, Daily, Ivette Resendiz MD, 100 mcg at 02/14/17 0601  •  loperamide (IMODIUM) capsule 2 mg, 2 mg, Oral, 4x Daily PRN, Ivette Resendiz MD  •  magnesium hydroxide (MILK OF MAGNESIA) suspension  2400 mg/10mL 10 mL, 10 mL, Oral, Daily PRN, Ivette Resendiz MD  •  metoprolol tartrate (LOPRESSOR) tablet 25 mg, 25 mg, Oral, Q12H, Ivette Resendiz MD, 25 mg at 02/14/17 0801  •  risperiDONE (risperDAL) tablet 3 mg, 3 mg, Oral, BID, Ivette Resendiz MD, 3 mg at 02/14/17 0801  •  risperiDONE microspheres (risperDAL CONSTA) injection 50 mg, 50 mg, Intramuscular, Q14 Days, Ivette Resendiz MD, 50 mg at 02/13/17 1045  •  traZODone (DESYREL) tablet 50 mg, 50 mg, Oral, Nightly PRN, Ivette Resendiz MD, 50 mg at 02/12/17 2035    Diagnoses/Assessment:   Active Problems:    Major neurocognitive disorder due to Alzheimer's disease, probable, with behavioral disturbance    Schizophrenia, paranoid type      Treatment Plan:       1) Will continue care for the patient on the behavioral health unit at [facility] to ensure patient safety.  2) Will continue to provide treatment with the unit milieu, activities, therapies and psychopharmacological management.  3) Patient to be placed on or continued on Q15 minute checks and Elopement, Aggression and Fall precautions.  4) Will continue medical management by Dr. Flores's consult.  5) Will order following labs: no new labs  6) Will make the following medication changes: none  ) Will continue discharge planning as appropriate for patient.  8) Psychotherapy provided: none  Treatment plan and medication risks and benefits discussed with: nursing staff and     LIVE Griggs  02/14/17  10:40 AM         Electronically signed by LIVE Griggs at 2/14/2017  2:14 PM        Consult Notes (last 72 hours) (Notes from 2/11/2017  3:55 PM through 2/14/2017  3:55 PM)     No notes of this type exist for this encounter.           Nursing Assessments (last 72 hours)      Psych PCS (Body System)       02/11/17 2331 02/12/17 1036 02/12/17 2101 02/13/17 0837 02/13/17 2020    Pain/Comfort/Sleep    Presence Of Pain non-verbal indicator of  pain/discomfort not present denies pain/discomfort denies pain/discomfort denies pain/discomfort denies pain/discomfort    Preferred Pain Scale   number (Numeric Rating Pain Scale)  number (Numeric Rating Pain Scale)    Sleep/Rest/Relaxation     appears asleep    Sleep Hygiene Promotion     awakenings minimized;regular sleep pattern promoted    Pain/Comfort/Sleep Interventions    Sleep/Rest Enhancement awakenings minimized  consistent schedule promoted;regular sleep/rest pattern promoted  awakenings minimized;regular sleep/rest pattern promoted    Coping/Psychosocial    Verbalized Emotional State peaceful  anger  acceptance    Plan Of Care Reviewed With patient  patient  patient    Patient Agreement with Plan of Care refuses to participate  refuses to participate;unable to participate  agrees with comment (describe)   pt cooperative at is time    Recreation Therapy    Physical barriers to participation     Wheelchair moderate difficulty with movement    Group Therapy Session    Group Attendance     excused from group session    Coping/Psychosocial Interventions    Behavior Management behavioral plan reviewed  boundaries reinforced;impulse control promoted  boundaries reinforced;impulse control promoted    Environment Familiarity/Consistency daily routine followed daily routine followed;personal clothing/items utilized daily routine followed daily routine followed;personal clothing/items utilized personal clothing/items utilized    Environmental Support calm environment promoted calm environment promoted calm environment promoted;personal routine supported calm environment promoted;caregiver consistency promoted;distractions minimized;rest periods encouraged;personal routine supported calm environment promoted    Behavioral    General Appearance WDL  WDL except;appearance WDL  WDL except  WDL except;appearance  WDL except;appearance    General Appearance other (see comments)   Hair in face & will not allow efforts  to keep pulled back  bizarre appearance;inappropriate dress for situation unkempt unkempt    Behavior WDL    Behavior WDL  WDL except;interactions WDL  WDL except  WDL except;interactions  WDL except;interactions    Interactions eye contact, darting;guarded;mistrustful  argumentative;avoids social contact;demanding;irrational;mistrustful;poor boundries (specify);suspicious;verbally abusive;uncooperative guarded;uncooperative guarded    Emotion Mood WDL    Emotion/Mood/Affect WDL WDL except;affect;emotion mood WDL WDL except WDL except;emotion mood WDL except;emotion mood;affect    Emotion/Mood/Affect distrustful/suspicious;somber calm distrustful/suspicious;irritable distrustful/suspicious;pessimistic distrustful/suspicious    Speech WDL    Speech WDL WDL WDL  WDL except WDL  WDL except;speech    Speech   loud;pressured speech;other (see comments)   cussing at times.  loud;pressured speech    Perceptual State WDL    Perceptual State WDL WDL WDL  WDL except WDL  WDL except;perceptual state    Hallucinations     denies hallucinations    Perceptual State derealization  derealization  derealization    Thought Process WDL    Thought Process WDL  WDL except WDL  WDL except  WDL except;thought process  WDL except;thought process    Delusions no delusions  paranoid  paranoid;obsessions    Judgment and Insight insight not appropriate to situation;judgment not appropriate to situation  insight not appropriate to situation;judgment not appropriate to situation judgment not appropriate to situation judgment not appropriate to situation;insight not appropriate to situation    Thought Content suspiciousness    suspiciousness    Thought Process illogical  disorganized;flight of ideas;illogical;loose associations illogical flight of ideas;illogical    Intellectual Performance WDL    Intellectual Performance WDL  WDL except;intellectual performance intellectual performance; WDL except  WDL except  WDL except;intellectual  performance;level of consciousness intellectual performance; WDL except    Intellectual Performance disoriented to situation;disoriented to time;disoriented to place;forgetfulness disoriented to place;disoriented to situation;disoriented to time disoriented to place;disoriented to situation;disoriented to time;impaired concentration;memory deficit, immediate;memory deficit, recent;memory deficit, remote;mind wandering;unable to follow commands;unable to learn new information;unable to perform tasks;unable to understand directions;unable to understand explanation/reasoning disoriented to place;disoriented to situation;disoriented to time disoriented to place;disoriented to situation;disoriented to time    Level of Consciousness Alert  Alert Alert     Cognitive    Cognitive/Neuro/Behavioral WDL  WDL except;mood/behavior;orientation  WDL except;orientation  WDL except WDL;orientation;mood/behavior  WDL except;orientation    Orientation disoriented to;place;time;situation disoriented to;place;time;situation disoriented to;place;time;situation disoriented to;place;time;situation disoriented to;place;time;situation    Mood/Behavior withdrawn;calm  agitated;flat affect;labile;withdrawn;uncooperative flat affect excitable    Cognitive Interventions    Communication Enhancement Strategies call light answered in person;extra time allowed for response;non-verbal strategies used;one step directions provided;repetition utilized  extra time allowed for response  extra time allowed for response    Orientation Measures reorientation provided;clock in view reorientation provided  reorientation provided reorientation provided    Sensory Stimulation Regulation quiet environment promoted   quiet environment promoted quiet environment promoted    Skin    Skin WDL WDL WDL   WDL    Robert Risk Assessment (If Robert score </= 18, add the appropriate CPG to the care plan)    Sensory Perception 4-->no impairment 3-->slightly limited 4-->no  impairment 4-->no impairment 3-->slightly limited    Moisture 4-->rarely moist 4-->rarely moist 4-->rarely moist 4-->rarely moist 4-->rarely moist    Activity 3-->walks occasionally 2-->chairfast 4-->walks frequently 2-->chairfast 2-->chairfast    Mobility 4-->no limitation 3-->slightly limited 4-->no limitation 4-->no limitation 4-->no limitation    Nutrition 2-->probably inadequate 3-->adequate 3-->adequate 3-->adequate 3-->adequate    Friction and Shear 3-->no apparent problem 3-->no apparent problem 3-->no apparent problem 3-->no apparent problem 3-->no apparent problem    Robert Score 20 18 22 20 19    Nutrition    Intake (%) other (see comments)   Consumed ice cream & a few bites of pudding. Refused fluids         Safety    Safety   bed in low position;wheels locked;call light in reach   patient refuses to wear ID band.  ID band on;bed in low position    Patient Location     patient's room    Observed Behavior     sleeping;calm    Suicide Risk    Suicidal Ideation other (see comments)   Pt. would not answer questions no no other (see comments)   Patient says to leave her alone no    Homicide Risk    Homicidal Ideation other (see comments)   Pt. would not answer questions no no other (see comments)   patient says to leave her alone no    [place]     Mental Status 14-->Confusion/disorientation 14-->Confusion/disorientation 14-->Confusion/disorientation 14-->Confusion/disorientation 14-->Confusion/disorientation    Elimination 8-->Independent with control of bowel/bladder 8-->Independent with control of bowel/bladder 8-->Independent with control of bowel/bladder 8-->Independent with control of bowel/bladder 8-->Independent with control of bowel/bladder    Medications 10-->Cardiac Medications;8-->Psychotropic medications (including benzodiazepines and antidepressants) 10-->Cardiac Medications;8-->Psychotropic medications (including benzodiazepines and antidepressants) 8-->Psychotropic medications (including  benzodiazepines and antidepressants) 10-->Cardiac Medications;8-->Psychotropic medications (including benzodiazepines and antidepressants) 10-->Cardiac Medications;8-->Psychotropic medications (including benzodiazepines and antidepressants)    Diagnosis 12-->Dementia/delirium 12-->Dementia/delirium 12-->Dementia/delirium 10-->Bipolar/schizoaffective disorder;12-->Dementia/delirium 10-->Bipolar/schizoaffective disorder    Ambulation/Balance 15-->Unsteady but forgets limitations 8-->Unsteady/requires assist and aware of abilities 8-->Proper use of assistive devices (cane, walker, w/c) 8-->Unsteady/requires assist and aware of abilities 8-->Unsteady/requires assist and aware of abilities    Nutrition 12-->Has had very little food or fluids in the past 24 hours 0-->No apparent abnormalities with appetite 0-->No apparent abnormalities with appetite 0-->No apparent abnormalities with appetite 0-->No apparent abnormalities with appetite    Sleep Disturbance  8-->No sleep disturbance 12-->Report of sleep disturbance by patient, family or staff 8-->No sleep disturbance 8-->No sleep disturbance    History of Falls  8-->No history of falls 8-->No history of falls 8-->No history of falls 8-->No history of falls    [place] Total 89 86 80 96 84    Safety Interventions    Safety Management   patient interviewed alone  patient interviewed alone    Safety Promotion/Fall Prevention   nonskid shoes/slippers when out of bed;safety round/check completed  nonskid shoes/slippers when out of bed    Environmental Safety Modification   assistive device/personal items within reach  assistive device/personal items within reach    Daily Care    Activity up ad alex  up ad alex      Elimination Assistance up to toilet          02/14/17 1868                Pain/Comfort/Sleep    Presence Of Pain denies pain/discomfort        Preferred Pain Scale number (Numeric Rating Pain Scale)

## 2017-02-14 NOTE — NURSING NOTE
Behavior   Anxiety 0  Depression 0  Pain 0  AVH no  S/I no  H/I no  Pt lying in bed  Awakened for meds and assessment, oriented to name only denies anxiety/depression and pain, minimal understanding of questions, will take meds with much coaxing.  Pt gave writer .25 tip.  Money removed from patient, discussion of why patient could not have money.  Poor understanding  Intervention  Instructed in med usage and effects, encouraged to verbalize needs. Meds administered as ordered.          Response  Verbalized understanding           Plan  Continue to monitor for safety/  every 15 minute monitoring checks.

## 2017-02-14 NOTE — NURSING NOTE
Behavior   Anxiety 0  Depression 0  Pain 0  AVH no  S/I no  H/I no          Intervention  Instructed in med usage and effects, encouraged to verbalize needs. Meds administered as ordered. Monitored throughout shift.          Response  Pt confused today but pleasant. Isolates self in room most of the time except for meal times. Uses assist of wheelchair.         Plan  Continue to monitor for safety/  every 15 minute monitoring checks. Will assist to meet treatment goals.

## 2017-02-14 NOTE — PLAN OF CARE
Problem: BH Patient Care Overview (Adult)  Goal: Plan of Care Review  Outcome: Ongoing (interventions implemented as appropriate)  Minimal participation with care plan  Goal: Interdisciplinary Rounds/Family Conference  Outcome: Ongoing (interventions implemented as appropriate)  Goal: Individualization and Mutuality  Outcome: Ongoing (interventions implemented as appropriate)  Minimal participation in care plan  Goal: Discharge Needs Assessment  Outcome: Ongoing (interventions implemented as appropriate)    Problem: BH Overarching Goals  Goal: Adheres to Safety Considerations for Self and Others  Outcome: Ongoing (interventions implemented as appropriate)  Up in wheelchair, wears non skid foot wear when up  Goal: Optimized Coping Skills in Response to Life Stressors  Outcome: Ongoing (interventions implemented as appropriate)  Minimal understanding /participation in care plan this shift  Goal: Develops/Participates in Therapeutic [place] to Support Successful Transition  Outcome: Ongoing (interventions implemented as appropriate)  Pt does not participate in care plan

## 2017-02-14 NOTE — PLAN OF CARE
Problem: BH Patient Care Overview (Adult)  Goal: Plan of Care Review  Outcome: Ongoing (interventions implemented as appropriate)  Goal: Interdisciplinary Rounds/Family Conference  Outcome: Ongoing (interventions implemented as appropriate)  Goal: Individualization and Mutuality  Outcome: Ongoing (interventions implemented as appropriate)  Goal: Discharge Needs Assessment  Outcome: Ongoing (interventions implemented as appropriate)    Problem:  Overarching Goals  Goal: Adheres to Safety Considerations for Self and Others  Outcome: Ongoing (interventions implemented as appropriate)  Goal: Optimized Coping Skills in Response to Life Stressors  Outcome: Ongoing (interventions implemented as appropriate)  Goal: Develops/Participates in Therapeutic [place] to Support Successful Transition  Outcome: Ongoing (interventions implemented as appropriate)

## 2017-02-15 NOTE — NURSING NOTE
Behavior   Anxiety 0  Depression 0  Pain 0  AVH no  S/I no  H/I no    Pt continues to be confused and forgetful. Pt was given a shower this afternoon by staff, pt argued with staff while being given a shower. Pt was not aggressive with staff and quickly calmed down after her bath. Pt unable to rate anxiety/depression related to confusion. Pt denies any needs/concerns at this time.        Intervention  Attempts made to reorient patient as needed. Pt reassured that she is safe here. Meds administered as ordered.          Response  Attempts to reorient patient were unsuccessful        Plan  Continue to monitor for safety/  every 15 minute monitoring checks.

## 2017-02-15 NOTE — NURSING NOTE
Behavior   Anxiety 0  Depression 0  Pain 0  AVH no  S/I no  H/I no    Pt is confused and forgetful. Pt can argue with staff when she does not want to complete a task or follow direction, pt is able to be redirected. No aggression or assaultive behavior noted. Pt takes meds well, crushed in ice cream. Pt has been up and moving around in her wheelchair most of the morning, pt able to use wheelchair independently.         Intervention  Instructed in med usage and effects, encouraged to verbalize needs. Meds administered as ordered.  Reinforce medication information with patient when appropriate.         Response  Pt nodded her head but is forgetful and confused, reorientation and reinforcement to be provided as needed          Plan  Continue to monitor for safety/  every 15 minute monitoring checks.

## 2017-02-15 NOTE — PLAN OF CARE
Problem: BH Patient Care Overview (Adult)  Goal: Plan of Care Review  Outcome: Ongoing (interventions implemented as appropriate)  Goal: Interdisciplinary Rounds/Family Conference  Outcome: Ongoing (interventions implemented as appropriate)  Goal: Individualization and Mutuality  Outcome: Ongoing (interventions implemented as appropriate)

## 2017-02-15 NOTE — PLAN OF CARE
CSW spoke with Whit with [facility] and confirmed that she is starting process to obtain PASSR so that pt can transfer to [facility] from [facility]. CSW to follow up pending PASSR approval.

## 2017-02-15 NOTE — PLAN OF CARE
Problem: BH Patient Care Overview (Adult)  Goal: Plan of Care Review  Outcome: Ongoing (interventions implemented as appropriate)  Goal: Interdisciplinary Rounds/Family Conference  Outcome: Ongoing (interventions implemented as appropriate)  Goal: Individualization and Mutuality  Outcome: Ongoing (interventions implemented as appropriate)  Goal: Discharge Needs Assessment  Outcome: Ongoing (interventions implemented as appropriate)    Problem:  Overarching Goals  Goal: Adheres to Safety Considerations for Self and Others  Outcome: Ongoing (interventions implemented as appropriate)  Goal: Optimized Coping Skills in Response to Life Stressors  Outcome: Ongoing (interventions implemented as appropriate)  Goal: Develops/Participates in Therapeutic [place] to Support Successful Transition  Outcome: Ongoing (interventions implemented as appropriate)

## 2017-02-15 NOTE — PROGRESS NOTES
2/15/2017    Chief Complaint: dementia related behavioral issues, psychosis and verbally aggressive, non-adherence with medications    Subjective:  Patient is a 76 y.o. female who was hospitalized for dementia related behavioral issues, psychosis and verbally aggressive, non-adherence with medications.   Since yesterday the patient has been stable.  Further history reported: Patient is pleasant today, she is alert to self and place.  She is dressed in one gown, two robes and has a robe over her shoulders.  She is able to correctly add 2+2, 4+4, 8+8, and 16+16.  She knows she is at [facility] in [place], [place]. She is wanting to have the  to come in and clean her room. She enjoys talking with the . She is not wanting to shower today, but agreed to being showered yesterday. She denies any constipation today. She reports she has had ECT treatments in the past, but did not remember where or when. States she did report it that she had the ECTs. Denies having any current physical problems. Continues to have paranoia and warns this practitioner about people coming in to steal things.     Objective     Vital Signs    Temp:  [96.6 °F (35.9 °C)-97.3 °F (36.3 °C)] 97.3 °F (36.3 °C)  Heart Rate:  [93-95] 93  Resp:  [18] 18  BP: (133-134)/(65-68) 134/65    Physical Exam:   General Appearance: alert, appears stated age and cooperative,  Hygiene:   fair  Gait & Station: Wheelchair  Musculoskeletal: In wheelchair.  No tremors noted.    Mental Status Exam:   Cooperation:  Cooperative  Eye Contact:  Good  Psychomotor Behavior:  Slow  Mood: Euthymic  Affect:  blunted  Speech:  Normal  Thought Process:  Poverty of thought  Associations: Loose Associations  Thought Content:     Normal   Suicidal:  None   Homicidal:  None   Hallucinations:  None   Delusion:  None  Cognitive Functioning:   Consciousness: awake and alert  Reliability:  good  Insight:  Poor  Judgement:  Poor  Impulse Control:  Poor    Lab  Results (last 24 hours)     ** No results found for the last 24 hours. **        Imaging Results (last 24 hours)     ** No results found for the last 24 hours. **          Medicine:   Current Facility-Administered Medications:   •  acetaminophen (TYLENOL) tablet 650 mg, 650 mg, Oral, Q4H PRN, Ivette Resendiz MD  •  aluminum-magnesium hydroxide-simethicone (MAALOX/MYLANTA) suspension 30 mL, 30 mL, Oral, Q6H PRN, Ivette Resendiz MD  •  aspirin chewable tablet 81 mg, 81 mg, Oral, Daily, Ivette Resendiz MD, 81 mg at 02/15/17 0816  •  CloNIDine (CATAPRES) tablet 0.1 mg, 0.1 mg, Oral, Q4H PRN, Ivette Resendiz MD  •  Divalproex Sodium (DEPAKOTE SPRINKLE) capsule 1,000 mg, 1,000 mg, Oral, Nightly, Dana Bojorquez, APRN, 1,000 mg at 02/14/17 2009  •  docusate sodium (COLACE) capsule 100 mg, 100 mg, Oral, BID PRN, Ivette Resendiz MD  •  hydrOXYzine (VISTARIL) capsule 50 mg, 50 mg, Oral, Q6H PRN, Ivette Resendiz MD  •  ipratropium-albuterol (DUO-NEB) nebulizer solution 3 mL, 3 mL, Nebulization, Q4H PRN, Ivette Resendiz MD  •  levothyroxine (SYNTHROID, LEVOTHROID) tablet 100 mcg, 100 mcg, Oral, Daily, Ivette Resendiz MD, 100 mcg at 02/15/17 0610  •  loperamide (IMODIUM) capsule 2 mg, 2 mg, Oral, 4x Daily PRN, Ivette Resendiz MD  •  magnesium hydroxide (MILK OF MAGNESIA) suspension 2400 mg/10mL 10 mL, 10 mL, Oral, Daily PRN, Ivette Resendiz MD  •  metoprolol tartrate (LOPRESSOR) tablet 25 mg, 25 mg, Oral, Q12H, Ivette Resendiz MD, 25 mg at 02/15/17 0816  •  risperiDONE (risperDAL) tablet 3 mg, 3 mg, Oral, BID, Ivette Resendiz MD, 3 mg at 02/15/17 0816  •  risperiDONE microspheres (risperDAL CONSTA) injection 50 mg, 50 mg, Intramuscular, Q14 Days, Ivette Resendiz MD, 50 mg at 02/13/17 1045  •  traZODone (DESYREL) tablet 50 mg, 50 mg, Oral, Nightly PRN, Ivette Resendiz MD, 50 mg at 02/12/17 2035    Diagnoses/Assessment:   Active Problems:    Major neurocognitive disorder due to  Alzheimer's disease, probable, with behavioral disturbance    Schizophrenia, paranoid type      Treatment Plan:    1) Will continue care for the patient on the behavioral health unit at [facility] to ensure patient safety.  2) Will continue to provide treatment with the unit milieu, activities, therapies and psychopharmacological management.  3) Patient to be placed on or continued on Q15 minute checks and Elopement, Aggression and Fall precautions.  4) Will continue medical management by Dr. Flores's consult.  5) Will order following labs: no new labs  6) Will make the following medication changes: none  7) Will continue discharge planning as appropriate for patient.  8) Psychotherapy provided: none  Treatment plan and medication risks and benefits discussed with: nursing staff and     Treatment plan and medication risks and benefits discussed with: Patient    Dana LIVE Alatorre  02/15/17  10:31 AM

## 2017-02-15 NOTE — NURSING NOTE
Behavior   Anxiety 5  Depression 5  Pain 0  AVH no  S/I no  H/I no    PT IS SLEEPING. EARLIER SHE WAS PLEASANT AND COOPERATIVE WITH EATING A SNACK AND TAKING HER MEDS.         Intervention  Instructed in med usage and effects, encouraged to verbalize needs. Meds administered as ordered.          Response  Verbalized understanding           Plan  Continue to monitor for safety/  every 15 minute monitoring checks.

## 2017-02-16 NOTE — NURSING NOTE
Behavior   Anxiety 0  Depression 0  Pain 0  AVH no  S/I no  H/I no   Pt. In room and is calm & cooperative. Requesting milkshake. Pt. Rambles making conversation difficult. Pt. Exhibits little interest in hearing what others say to her, however will repeat her request/comments until validated. No verbally aggressive behavior symptoms at this time.      Intervention  Instructed in med usage and effects, encouraged to verbalize needs. Meds administered as ordered.    Response  Pt. Would not verbalize understanding.    Plan  Continue to monitor for safety/  every 15 minute monitoring checks.

## 2017-02-16 NOTE — PLAN OF CARE
Problem: BH Patient Care Overview (Adult)  Goal: Plan of Care Review  Outcome: Ongoing (interventions implemented as appropriate)    02/16/17 0534   Coping/Psychosocial Response Interventions   Plan Of Care Reviewed With patient   Coping/Psychosocial   Patient Agreement with Plan of Care unable to participate   Patient Care Overview   Progress no change       Goal: Individualization and Mutuality  Outcome: Ongoing (interventions implemented as appropriate)  Goal: Discharge Needs Assessment  Outcome: Ongoing (interventions implemented as appropriate)    Problem: BH Overarching Goals  Goal: Adheres to Safety Considerations for Self and Others  Outcome: Ongoing (interventions implemented as appropriate)  Goal: Optimized Coping Skills in Response to Life Stressors  Outcome: Ongoing (interventions implemented as appropriate)  Goal: Develops/Participates in Therapeutic [place] to Support Successful Transition  Outcome: Ongoing (interventions implemented as appropriate)

## 2017-02-16 NOTE — SIGNIFICANT NOTE
02/16/17 4858   Rehab Treatment   Discipline physical therapist   Rehab Evaluation   Evaluation Not Performed other (see comments)  (PT was with pt for 15 minutes but pt not able/willing to participate. Pt transferred bed to w/c and then w/c to toilet independently but would not agree to walk with PT. Will attempt to see pt tomorrow for evaluation)   Recommendation   PT - Next Appointment 02/16/17

## 2017-02-16 NOTE — NURSING NOTE
Behavior   Anxiety 0  Depression 0  Pain 0  AVH no  S/I no  H/I no    Pt unable to rate anxiety/depression or participate fully in assessment related to confusion. Pt states she is not feeling sad or upset this morning, denies pain/needs/concerns        Intervention  Provide reorientation and reassurance as needed.. Meds administered as ordered.          Response  Attempts to reorient patient unsuccessful, pt remains calm with staff          Plan  Continue to monitor for safety/  every 15 minute monitoring checks.

## 2017-02-16 NOTE — PLAN OF CARE
Problem: BH Patient Care Overview (Adult)  Goal: Plan of Care Review  Outcome: Ongoing (interventions implemented as appropriate)  Goal: Interdisciplinary Rounds/Family Conference  Outcome: Ongoing (interventions implemented as appropriate)  Goal: Individualization and Mutuality  Outcome: Ongoing (interventions implemented as appropriate)  Goal: Discharge Needs Assessment  Outcome: Ongoing (interventions implemented as appropriate)    Problem:  Overarching Goals  Goal: Adheres to Safety Considerations for Self and Others  Outcome: Ongoing (interventions implemented as appropriate)  Goal: Optimized Coping Skills in Response to Life Stressors  Outcome: Ongoing (interventions implemented as appropriate)  Goal: Develops/Participates in Therapeutic [place] to Support Successful Transition  Outcome: Ongoing (interventions implemented as appropriate)

## 2017-02-16 NOTE — NURSING NOTE
Behavior   Anxiety 0  Depression 0  Pain 0  AVH no  S/I no  H/I no     Pt unable to complete assessment related to confusion. Pt has been pleasant, calm and cooperative with staff, no aggression noted.            Intervention  Encouraged to verbalize needs. Meds administered as ordered.          Response  Staff to be available to reorient and reassure patient as needed          Plan  Continue to monitor for safety/  every 15 minute monitoring checks.

## 2017-02-16 NOTE — PROGRESS NOTES
"    2/16/2017     Chief Complaint: dementia related behavioral issues, psychosis and verbally aggressive, non-adherence with medications     Subjective:  Patient is a 76 y.o. female who was hospitalized for dementia related behavioral issues, psychosis and verbally aggressive, non-adherence with medications. Since yesterday the patient has been stable. Further history reported: Patient is pleasant today, she is alert to self and place. She is dressed in one gown, two robes and has a robe over her shoulders. Continues to have paranoia, saying that random people are coming into her room and taking money out of her jacket. No attempts to harm self or others. No attempts to elope. Follows staff's directions. She denies constipation today. She is able to feed herself. Denies any pain.      Objective        Vital Signs     Visit Vitals   • /56 (BP Location: Left arm, Patient Position: Lying)   • Pulse 76   • Temp 97.8 °F (36.6 °C) (Oral)   • Resp 20   • Ht 65\" (165.1 cm)   • Wt 160 lb (72.6 kg)   • SpO2 95%   • BMI 26.63 kg/m2          Physical Exam:   General Appearance: alert, appears stated age and cooperative,  Hygiene: fair  Gait & Station: Wheelchair  Musculoskeletal: In wheelchair. No tremors noted.     Mental Status Exam:   Cooperation: Cooperative  Eye Contact: Good  Psychomotor Behavior: Slow  Mood: irritable at times, but cooperative  Affect: blunted  Speech: Normal  Thought Process: Poverty of thought  Associations: Loose Associations  Thought Content:   Normal  Suicidal: None  Homicidal: None  Hallucinations: None  Delusion: None  Cognitive Functioning:  Consciousness: awake and alert  Reliability: good  Insight: Poor  Judgement: Poor  Impulse Control: Poor  Oriented to self and place only  Appetite: Good     CBC, CMP,TSH, UDS, acetaminophen level, salicylate level, ethanol level, U/A all normal except        COMPREHENSIVE METABOLIC PANEL - Abnormal; Notable for the following:    Result Value        " Glucose 115 (*)         Creatinine 0.46 (*)         Sodium 132 (*)         eGFR Non  Amer 131 (*)         All other components within normal limits      Narrative:       The MDRD GFR formula is only valid for adults with stable renal function between ages 18 and 70.   ACETAMINOPHEN LEVEL - Abnormal; Notable for the following:       Acetaminophen <10.0 (*)         All other components within normal limits   SALICYLATE LEVEL - Abnormal; Notable for the following:       Salicylate <1.0 (*)         All other components within normal limits   TSH - Abnormal; Notable for the following:       TSH 10.300 (*)         All other components within normal limits   CBC WITH AUTO DIFFERENTIAL - Abnormal; Notable for the following:       Monocytes, Absolute 0.91 (*)         All other components within normal limits   URINALYSIS W/ CULTURE IF INDICATED - Abnormal; Notable for the following:       Leuk Esterase, UA Small (1+) (*)         All other components within normal limits   URINALYSIS, MICROSCOPIC ONLY - Abnormal; Notable for the following:       RBC, UA 0-2 (*)         WBC, UA 6-12 (*)         All other components within normal limits           Medicine:   Current Facility-Administered Medications:   • acetaminophen (TYLENOL) tablet 650 mg, 650 mg, Oral, Q4H PRN, Ivette Resendiz MD  • aluminum-magnesium hydroxide-simethicone (MAALOX/MYLANTA) suspension 30 mL, 30 mL, Oral, Q6H PRN, Ivette Resendiz MD  • aspirin chewable tablet 81 mg, 81 mg, Oral, Daily, Ivette Resendiz MD, 81 mg at 02/15/17 0816  • CloNIDine (CATAPRES) tablet 0.1 mg, 0.1 mg, Oral, Q4H PRN, Ivette Resendiz MD  • Divalproex Sodium (DEPAKOTE SPRINKLE) capsule 1,000 mg, 1,000 mg, Oral, Nightly, Dana Bojorquez, APRN, 1,000 mg at 02/14/17 2009  • docusate sodium (COLACE) capsule 100 mg, 100 mg, Oral, BID PRN, Ivette Resendiz MD  • hydrOXYzine (VISTARIL) capsule 50 mg, 50 mg, Oral, Q6H PRN, Ivette Resendiz MD  • ipratropium-albuterol  (DUO-NEB) nebulizer solution 3 mL, 3 mL, Nebulization, Q4H PRN, Ivette Resendiz MD  • levothyroxine (SYNTHROID, LEVOTHROID) tablet 100 mcg, 100 mcg, Oral, Daily, Ivette Resendiz MD, 100 mcg at 02/15/17 0610  • loperamide (IMODIUM) capsule 2 mg, 2 mg, Oral, 4x Daily PRN, Ivette Resendiz MD  • magnesium hydroxide (MILK OF MAGNESIA) suspension 2400 mg/10mL 10 mL, 10 mL, Oral, Daily PRN, Ivette Resendiz MD  • metoprolol tartrate (LOPRESSOR) tablet 25 mg, 25 mg, Oral, Q12H, Ivette Resendiz MD, 25 mg at 02/15/17 0816  • risperiDONE (risperDAL) tablet 3 mg, 3 mg, Oral, BID, Ivette Resendiz MD, 3 mg at 02/15/17 0816  • risperiDONE microspheres (risperDAL CONSTA) injection 50 mg, 50 mg, Intramuscular, Q14 Days, Ivette Resendiz MD, 50 mg at 02/13/17 1045  • traZODone (DESYREL) tablet 50 mg, 50 mg, Oral, Nightly PRN, Ivette Resendiz MD, 50 mg at 02/12/17 2035     Diagnoses/Assessment:   Active Problems:  Major neurocognitive disorder due to Alzheimer's disease, probable, with behavioral disturbance  Schizophrenia, paranoid type        Treatment Plan:     1) Will continue care for the patient on the behavioral health unit at [facility] to ensure patient safety.  2) Will continue to provide treatment with the unit milieu, activities, therapies and psychopharmacological management.  3) Patient to be placed on or continued on Q15 minute checks and Elopement, Aggression and Fall precautions.  4) Will continue medical management by Dr. Flores's consult.  5) Will order following labs: no new labs ordered   6) Will make the following medication changes: decrease Risperdal to 2 mg BID. Monitor for delusional behaviors  7) Will continue discharge planning as appropriate for patient. Return to [facility] in [place]  8) Psychotherapy provided: reoriented  9) PT referral completed  10) Depakote level ordered for AM  Treatment plan and medication risks and benefits discussed with:   Astrid, nursing staff and      Treatment plan and medication risks and benefits discussed with: Patient     Dana Lissette Bojorquez, APRN

## 2017-02-17 NOTE — NURSING NOTE
Behavior   Anxiety 0  Depression 0  Pain 0  AVH no  S/I no  H/I no   Pt. locomotion per w/c independently. Pt. Cooperative, calm & smiling with interaction. Fair eye contact & rambles with conversation. Pt. Requesting frequent snacks & getting frustrated that she cannot have snacks all evening. No aggressive behaviors noted.                 Intervention  Instructed in med usage and effects, encouraged to verbalize needs. Meds administered as ordered.              Response  confused & unable to fully understand           Plan  Continue to monitor for safety/ every 15 minute monitoring checks.
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
## 2017-02-17 NOTE — NURSING NOTE
Behavior   Anxiety 0  Depression 0  Pain 0  AVH no  S/I no  H/I no            Intervention  Instructed in med usage and effects, encouraged to verbalize needs. Meds administered as ordered.          Response  Verbalized understanding           Plan  Continue to monitor for safety/  every 15 minute monitoring checks.

## 2017-02-17 NOTE — PLAN OF CARE
Problem: BH Patient Care Overview (Adult)  Goal: Plan of Care Review  Outcome: Ongoing (interventions implemented as appropriate)    02/17/17 0525   Coping/Psychosocial Response Interventions   Plan Of Care Reviewed With patient   Coping/Psychosocial   Patient Agreement with Plan of Care unable to participate   Patient Care Overview   Progress no change       Goal: Individualization and Mutuality  Outcome: Ongoing (interventions implemented as appropriate)  Goal: Discharge Needs Assessment  Outcome: Ongoing (interventions implemented as appropriate)    Problem: BH Overarching Goals  Goal: Adheres to Safety Considerations for Self and Others  Outcome: Ongoing (interventions implemented as appropriate)  Goal: Optimized Coping Skills in Response to Life Stressors  Outcome: Ongoing (interventions implemented as appropriate)  Goal: Develops/Participates in Therapeutic [place] to Support Successful Transition  Outcome: Ongoing (interventions implemented as appropriate)

## 2017-02-17 NOTE — NURSING NOTE
"Behavior   Anxiety 0  Depression 0  Pain 0  AVH no  S/I no  H/I no  Up in w/c. \"I dont know whats on their mind.I know better than they do.\"I'm locked in assisted here.\" minimal interaction with staff when prompted. Refused am group. Fair eye contact. Minimal interaction with peers.          Intervention  Instructed in med usage and effects, encouraged to verbalize needs. Meds administered as ordered.encouraged pt to increase interaction with peers. Participate in group.          Response  Verbalized understanding           Plan  Continue to monitor for safety/  every 15 minute monitoring checks.  "

## 2017-02-17 NOTE — PROGRESS NOTES
Acute Care - Physical Therapy discharge  [facility]     Patient Name: Joycelyn Rangel  : 1940  MRN: [id number]  Today's Date: 2017      Date of Referral to PT: 17  Referring Physician: ROSEY Price      Admit Date: 2/10/2017     Visit Dx:    ICD-10-CM ICD-9-CM   1. Schizophrenia, paranoid type F20.0 295.30     Patient Active Problem List   Diagnosis   • Major neurocognitive disorder due to Alzheimer's disease, probable, with behavioral disturbance   • Schizophrenia, paranoid type     Past Medical History   Diagnosis Date   • Bipolar affective    • COPD (chronic obstructive pulmonary disease)    • Depression    • Disease of thyroid gland    • Dyskinesia    • GERD (gastroesophageal reflux disease)    • Hypertension    • Hyponatremia    • Schizoaffective disorder      History reviewed. No pertinent past surgical history.       PT ASSESSMENT (last 72 hours)      PT Evaluation       17 0946 17 1604    Rehab Evaluation    Document Type evaluation  -AW     Subjective Information no complaints  -AW     Evaluation Not Performed  other (see comments)   PT was with pt for 15 minutes but pt not able/willing to participate. Pt transferred bed to w/c and then w/c to toilet independently but would not agree to walk with PT. Will attempt to see pt tomorrow for evaluation  -MN    Patient Effort, Rehab Treatment poor  -AW     General Information    Patient Profile Review yes  -AW     Referring Physician ROSEY Price  -AW     General Observations Pt very talkative  defers gait but did stand up  from w/c and transfer to toilet.   -AW     Precautions/Limitations fall precautions  -AW     Barriers to Rehab cognitive status  -AW     Living Environment    Lives With facility resident  -AW     Clinical Impression    Date of Referral to PT 17  -AW     PT Diagnosis Schizophrenic  -AW     Criteria for Skilled Therapeutic Interventions Met no;other (see comments)   pt unable to  participate on consistent basis  -AW     Pain Assessment    Pain Assessment No/denies pain  -AW     Cognitive Assessment/Intervention    Current Cognitive/Communication Assessment functional  -AW     ROM (Range of Motion)    General ROM no range of motion deficits identified  -AW     MMT (Manual Muscle Testing)    General MMT Assessment no strength deficits identified  -AW     Bed Mobility, Assessment/Treatment    Bed Mob, Supine to Sit, [place] not tested  -AW     Bed Mob, Sit to Supine, [place] not tested  -AW     Transfer Assessment/Treatment    Transfers, Sit-Stand [place] supervision required  -AW     Transfers, Stand-Sit [place] supervision required  -AW     Toilet Transfer, [place] supervision required  -AW     Toilet Transfer, Assistive Device elevated toilet seat  -AW     Gait Assessment/Treatment    Gait, [place] Level other (see comments)   pt refused  -AW     Gait, Comment --   Pt self propel w/c and defer gait.  -AW     Positioning and Restraints    Pre-Treatment Position --   w/c  -AW     Post Treatment Position bathroom  -AW     Bathroom notified nsg;call light within reach  -AW       User Key  (r) = Recorded By, (t) = Taken By, (c) = Cosigned By    Initials Name Provider Type    DWIGHT Nichole, PT Physical Therapist    AW Cristy Suarez, PT Physical Therapist              PT Recommendation and Plan  PT Frequency: evaluation only                 Outcome Measures       02/17/17 0913          How much help from another person do you currently need...    Turning from your back to your side while in flat bed without using bedrails? 4  -AW      Moving from lying on back to sitting on the side of a flat bed without bedrails? 4  -AW      Moving to and from a bed to a chair (including a wheelchair)? 3  -AW      Standing up from a chair using your arms (e.g., wheelchair, bedside chair)? 3  -AW      Climbing 3-5 steps with a railing? 3  -AW      To walk in hospital room? 3   -AW      AM-PAC 6 Clicks Score 20  -AW      Functional Assessment    Outcome Measure Options AM-PAC 6 Clicks Basic Mobility (PT)  -AW        User Key  (r) = Recorded By, (t) = Taken By, (c) = Cosigned By    Initials Name Provider Type    BUCKY Suarez PT Physical Therapist           Time Calculation:         PT Charges       02/17/17 1139          Time Calculation    Start Time 0946  -AW      Stop Time 1005  -AW      Time Calculation (min) 19 min  -AW      PT Received On 02/17/17  -AW        User Key  (r) = Recorded By, (t) = Taken By, (c) = Cosigned By    Initials Name Provider Type    BUCKY Suarez PT Physical Therapist          Therapy Charges for Today     Code Description Service Date Service Provider Modifiers Qty    91415630889 HC PT MOBILITY CURRENT 2/17/2017 Cristy Suarez, PT GP, CI 1    38642358543 HC PT MOBILITY PROJECTED 2/17/2017 Cristy Suarez, PT GP, CI 1    96490571745 HC PT MOBILITY DISCHARGE 2/17/2017 Cristy Suarez, PT GP, CI 1    88274109100 HC PT EVAL LOW COMPLEXITY 2 2/17/2017 Cristy Suarez, PT GP 1          PT G-Codes  Outcome Measure Options: AM-PAC 6 Clicks Basic Mobility (PT)  Score: 20  Functional Limitation: Mobility: Walking and moving around  Mobility: Walking and Moving Around Current Status (): At least 1 percent but less than 20 percent impaired, limited or restricted  Mobility: Walking and Moving Around Goal Status (): At least 1 percent but less than 20 percent impaired, limited or restricted  Mobility: Walking and Moving Around Discharge Status (): At least 1 percent but less than 20 percent impaired, limited or restricted      Cristy Suarez, PT  2/17/2017

## 2017-02-17 NOTE — SIGNIFICANT NOTE
02/17/17 1442   Individual Counseling   Time Session Began 1420   Time Session Ended 1440   Total Time (minutes) 20   Topic Safety/DC Plan   Session Detail CSW met with pt 1:1 and spoke with Whit ([employer] [facility]) and State Guardian to complete dc plan   Patient Response Pt continues to be alert and oriented to self only. Pt has been irritable and agitated at times, calling staff inappropriate names. Pt has been approved to move from [facility] in [place] to [facility]. [facility] has accepted and state guardian is agreeable and provided consent. Pt has reached baseline and maximum benefit from our program. pt refused to participate in most unit activities, including PT/OT. CSW arranged for EMS to transport patient. PGDRS was complete and transfer packet to be sent with pt to [facility]. RN advised to call report and fax orders to facility.

## 2017-02-18 NOTE — NURSING NOTE
EMS staff arrived to unit for transport of patient to, [facility].  Proper paper work acknowledged and provided to EMS staff, belongings checked and placed in possession of EMS staff and pt safely transferred to stretcher.  Information and explanation of transfer provided to patient, pt unable to verbalize understanding.  Pt is confused.

## 2017-06-27 NOTE — ED NOTES
Pt came in ER in full arrest..  Pronounced .   [facility] office called , spoke with Idalmis Beebe.  Will fax info to her at [phone].  [facility]  Home should be contacted at [phone].  Nurse aware.  Idalmis stated she would contact family.

## 2017-06-27 NOTE — ED PROVIDER NOTES
Subjective   HPI Comments: 77 year old was brought in by ems with full arrest cpr in progress   At nursing home she was found unresponsive and pulseless at 9:40 and ems was called. Ems arrived at scene at 10:10 and intubated     Patient is a 77 y.o. female presenting with cardiac arrest.   History provided by:  Patient  Cardiac Arrest   Witnessed by:  Not witnessed  Incident location:  assisted  Time since incident:  1 hour  Time before BLS initiated:  > 5 minutes  Time before ALS initiated:  8-10 minutes  Condition upon EMS arrival:  Unresponsive  Pulse:  Absent  Initial cardiac rhythm per EMS:  Asystole  Treatments prior to arrival:  ACLS protocol, intubation and vascular access  Medications given prior to ED:  Epinephrine  Airway:  Bag valve mask and oropharyngeal  Rhythm on admission to ED:  Asystole      Review of Systems   Unable to perform ROS: Patient unresponsive       Past Medical History:   Diagnosis Date   • Bipolar affective    • COPD (chronic obstructive pulmonary disease)    • Depression    • Disease of thyroid gland    • Dyskinesia    • GERD (gastroesophageal reflux disease)    • Hypertension    • Hyponatremia    • Schizoaffective disorder        Allergies   Allergen Reactions   • Thorazine [Chlorpromazine]        History reviewed. No pertinent surgical history.    No family history on file.    Social History     Social History   • Marital status: Unknown     Spouse name: N/A   • Number of children: N/A   • Years of education: N/A     Social History Main Topics   • Smoking status: Never Smoker   • Smokeless tobacco: Never Used   • Alcohol use No   • Drug use: Defer   • Sexual activity: Defer     Other Topics Concern   • None     Social History Narrative           Objective    There were no vitals taken for this visit.    Physical Exam   Constitutional: She is intubated.   HENT:   Fixed dilated pupils both sides    Cardiovascular:   Asystole .   Pulmonary/Chest: She is intubated.   Abdominal: She  exhibits distension.   Neurological: She is unresponsive. GCS eye subscore is 1. GCS verbal subscore is 1. GCS motor subscore is 1.       Critical Care  Performed by: MED RICHARDSON  Authorized by: MED RICHARDSON   Total critical care time: 15 minutes  Critical care time was exclusive of separately billable procedures and treating other patients and teaching time.  Critical care was necessary to treat or prevent imminent or life-threatening deterioration of the following conditions: cardiac failure and respiratory failure.  Critical care was time spent personally by me on the following activities: review of old charts, pulse oximetry, re-evaluation of patient's condition, ordering and review of radiographic studies, ordering and review of laboratory studies, ordering and performing treatments and interventions, obtaining history from patient or surrogate, examination of patient and evaluation of patient's response to treatment.               ED Course  ED Course      Labs Reviewed   POCT GLUCOSE FINGERSTICK - Abnormal; Notable for the following:        Result Value    Glucose 255 (*)     All other components within normal limits     No results found.     high quality CPR was continued on arrival to ed.   No pulse obtained .   2 epi and one bicarbonate given .   No response to anything .  Asystole while in ed.   Considering she was in arrest for more than 1 hour with no response to any intervention she was pronounced at 11:24    MDM  Number of Diagnoses or Management Options  Cardiac arrest:       Final diagnoses:   Cardiac arrest            Med Richardson MD  06/27/17 5688       Med Richardson MD  06/27/17 5742